# Patient Record
Sex: MALE | Race: WHITE | NOT HISPANIC OR LATINO | URBAN - METROPOLITAN AREA
[De-identification: names, ages, dates, MRNs, and addresses within clinical notes are randomized per-mention and may not be internally consistent; named-entity substitution may affect disease eponyms.]

---

## 2017-02-17 RX ORDER — LOSARTAN/HYDROCHLOROTHIAZIDE 100MG-25MG
0 TABLET ORAL
Qty: 0 | Refills: 0 | COMMUNITY
Start: 2017-02-17

## 2017-03-15 RX ORDER — SIMVASTATIN 20 MG/1
0 TABLET, FILM COATED ORAL
Qty: 90 | Refills: 0 | COMMUNITY
Start: 2017-03-15

## 2017-03-15 RX ORDER — SIMVASTATIN 20 MG/1
0 TABLET, FILM COATED ORAL
Qty: 0 | Refills: 0 | COMMUNITY
Start: 2017-03-15

## 2017-05-04 PROBLEM — Z00.00 ENCOUNTER FOR PREVENTIVE HEALTH EXAMINATION: Status: ACTIVE | Noted: 2017-05-04

## 2017-05-05 ENCOUNTER — OUTPATIENT (OUTPATIENT)
Dept: OUTPATIENT SERVICES | Facility: HOSPITAL | Age: 57
LOS: 1 days | End: 2017-05-05
Payer: COMMERCIAL

## 2017-05-05 VITALS
WEIGHT: 257.94 LBS | RESPIRATION RATE: 16 BRPM | HEIGHT: 69 IN | TEMPERATURE: 98 F | HEART RATE: 87 BPM | SYSTOLIC BLOOD PRESSURE: 147 MMHG | DIASTOLIC BLOOD PRESSURE: 95 MMHG

## 2017-05-05 DIAGNOSIS — R31.29 OTHER MICROSCOPIC HEMATURIA: ICD-10-CM

## 2017-05-05 DIAGNOSIS — Z01.818 ENCOUNTER FOR OTHER PREPROCEDURAL EXAMINATION: ICD-10-CM

## 2017-05-05 DIAGNOSIS — I10 ESSENTIAL (PRIMARY) HYPERTENSION: ICD-10-CM

## 2017-05-05 DIAGNOSIS — D58.2 OTHER HEMOGLOBINOPATHIES: ICD-10-CM

## 2017-05-05 DIAGNOSIS — Z41.9 ENCOUNTER FOR PROCEDURE FOR PURPOSES OTHER THAN REMEDYING HEALTH STATE, UNSPECIFIED: Chronic | ICD-10-CM

## 2017-05-05 DIAGNOSIS — C43.9 MALIGNANT MELANOMA OF SKIN, UNSPECIFIED: Chronic | ICD-10-CM

## 2017-05-05 LAB
ALBUMIN SERPL ELPH-MCNC: 3.7 G/DL — SIGNIFICANT CHANGE UP (ref 3.3–5)
ALP SERPL-CCNC: 63 U/L — SIGNIFICANT CHANGE UP (ref 40–120)
ALT FLD-CCNC: 14 U/L — SIGNIFICANT CHANGE UP (ref 12–78)
ANION GAP SERPL CALC-SCNC: 8 MMOL/L — SIGNIFICANT CHANGE UP (ref 5–17)
APPEARANCE UR: CLEAR — SIGNIFICANT CHANGE UP
AST SERPL-CCNC: 26 U/L — SIGNIFICANT CHANGE UP (ref 15–37)
BILIRUB SERPL-MCNC: 1.5 MG/DL — HIGH (ref 0.2–1.2)
BILIRUB UR-MCNC: NEGATIVE — SIGNIFICANT CHANGE UP
BUN SERPL-MCNC: 21 MG/DL — SIGNIFICANT CHANGE UP (ref 7–23)
CALCIUM SERPL-MCNC: 9.6 MG/DL — SIGNIFICANT CHANGE UP (ref 8.5–10.1)
CHLORIDE SERPL-SCNC: 103 MMOL/L — SIGNIFICANT CHANGE UP (ref 96–108)
CO2 SERPL-SCNC: 28 MMOL/L — SIGNIFICANT CHANGE UP (ref 22–31)
COLOR SPEC: YELLOW — SIGNIFICANT CHANGE UP
CREAT SERPL-MCNC: 1.1 MG/DL — SIGNIFICANT CHANGE UP (ref 0.5–1.3)
DIFF PNL FLD: ABNORMAL
GLUCOSE SERPL-MCNC: 64 MG/DL — LOW (ref 70–99)
GLUCOSE UR QL: NEGATIVE — SIGNIFICANT CHANGE UP
HCT VFR BLD CALC: 64.8 % — CRITICAL HIGH (ref 39–50)
HGB BLD-MCNC: 20.5 G/DL — CRITICAL HIGH (ref 13–17)
KETONES UR-MCNC: NEGATIVE — SIGNIFICANT CHANGE UP
LEUKOCYTE ESTERASE UR-ACNC: NEGATIVE — SIGNIFICANT CHANGE UP
MCHC RBC-ENTMCNC: 27.9 PG — SIGNIFICANT CHANGE UP (ref 27–34)
MCHC RBC-ENTMCNC: 31.6 GM/DL — LOW (ref 32–36)
MCV RBC AUTO: 88.4 FL — SIGNIFICANT CHANGE UP (ref 80–100)
NITRITE UR-MCNC: NEGATIVE — SIGNIFICANT CHANGE UP
PH UR: 5 — SIGNIFICANT CHANGE UP (ref 5–8)
PLATELET # BLD AUTO: 236 K/UL — SIGNIFICANT CHANGE UP (ref 150–400)
POTASSIUM SERPL-MCNC: 3.7 MMOL/L — SIGNIFICANT CHANGE UP (ref 3.5–5.3)
POTASSIUM SERPL-SCNC: 3.7 MMOL/L — SIGNIFICANT CHANGE UP (ref 3.5–5.3)
PROT SERPL-MCNC: 7.7 G/DL — SIGNIFICANT CHANGE UP (ref 6–8.3)
PROT UR-MCNC: 25 MG/DL
RBC # BLD: 7.33 M/UL — HIGH (ref 4.2–5.8)
RBC # FLD: 16.1 % — HIGH (ref 10.3–14.5)
SODIUM SERPL-SCNC: 139 MMOL/L — SIGNIFICANT CHANGE UP (ref 135–145)
SP GR SPEC: 1.01 — SIGNIFICANT CHANGE UP (ref 1.01–1.02)
UROBILINOGEN FLD QL: NEGATIVE — SIGNIFICANT CHANGE UP
WBC # BLD: 7.6 K/UL — SIGNIFICANT CHANGE UP (ref 3.8–10.5)
WBC # FLD AUTO: 7.6 K/UL — SIGNIFICANT CHANGE UP (ref 3.8–10.5)

## 2017-05-05 PROCEDURE — 87086 URINE CULTURE/COLONY COUNT: CPT

## 2017-05-05 PROCEDURE — 93005 ELECTROCARDIOGRAM TRACING: CPT

## 2017-05-05 PROCEDURE — G0463: CPT

## 2017-05-05 PROCEDURE — 85027 COMPLETE CBC AUTOMATED: CPT

## 2017-05-05 PROCEDURE — 80053 COMPREHEN METABOLIC PANEL: CPT

## 2017-05-05 PROCEDURE — 81001 URINALYSIS AUTO W/SCOPE: CPT

## 2017-05-05 PROCEDURE — 93010 ELECTROCARDIOGRAM REPORT: CPT | Mod: NC

## 2017-05-05 NOTE — H&P PST ADULT - HISTORY OF PRESENT ILLNESS
58 yo male scheduled for Cysto on 5/15/17 with Dr Castro.  Patient states he had hematuria in February 2017.  Repeat urinalysis one month ago showed hematuria.

## 2017-05-05 NOTE — H&P PST ADULT - FAMILY HISTORY
Mother  Still living? No  Family history of ovarian cancer, Age at diagnosis: Age Unknown     Father  Still living? No  Family history of renal cancer, Age at diagnosis: Age Unknown     Sibling  Still living? Yes, Estimated age: 60  Family history of breast cancer, Age at diagnosis: Age Unknown

## 2017-05-05 NOTE — H&P PST ADULT - PROBLEM SELECTOR PLAN 3
Hgb 20.4 Dr Darling was notified by phone 5/5/17  11:40am    Dr Darling states patient has Polycythemia and is treated with phlebotomy  Results of EKG and labs  were faxed to Dr Darling

## 2017-05-05 NOTE — H&P PST ADULT - NSANTHOSAYNRD_GEN_A_CORE
No. CAITLIN screening performed.  STOP BANG Legend: 0-2 = LOW Risk; 3-4 = INTERMEDIATE Risk; 5-8 = HIGH Risk

## 2017-05-05 NOTE — H&P PST ADULT - PROBLEM SELECTOR PLAN 1
56 yo male scheduled for Cysto on 5/15/17 with Dr Castro.   Check labs CBC CMP UA Urine Culture  Medical Clearance  Preop instructions were given  Patient verbalizes understanding of instructions

## 2017-05-06 LAB
CULTURE RESULTS: NO GROWTH — SIGNIFICANT CHANGE UP
SPECIMEN SOURCE: SIGNIFICANT CHANGE UP

## 2017-05-16 ENCOUNTER — OUTPATIENT (OUTPATIENT)
Dept: OUTPATIENT SERVICES | Facility: HOSPITAL | Age: 57
LOS: 1 days | End: 2017-05-16
Payer: COMMERCIAL

## 2017-05-16 DIAGNOSIS — C43.9 MALIGNANT MELANOMA OF SKIN, UNSPECIFIED: Chronic | ICD-10-CM

## 2017-05-16 DIAGNOSIS — Z41.9 ENCOUNTER FOR PROCEDURE FOR PURPOSES OTHER THAN REMEDYING HEALTH STATE, UNSPECIFIED: Chronic | ICD-10-CM

## 2017-05-16 PROCEDURE — 85018 HEMOGLOBIN: CPT

## 2017-05-16 PROCEDURE — 99195 PHLEBOTOMY: CPT

## 2017-05-17 DIAGNOSIS — R31.29 OTHER MICROSCOPIC HEMATURIA: ICD-10-CM

## 2017-05-18 ENCOUNTER — OUTPATIENT (OUTPATIENT)
Dept: OUTPATIENT SERVICES | Facility: HOSPITAL | Age: 57
LOS: 1 days | End: 2017-05-18
Payer: COMMERCIAL

## 2017-05-18 DIAGNOSIS — C43.9 MALIGNANT MELANOMA OF SKIN, UNSPECIFIED: Chronic | ICD-10-CM

## 2017-05-18 DIAGNOSIS — D75.1 SECONDARY POLYCYTHEMIA: ICD-10-CM

## 2017-05-18 DIAGNOSIS — Z41.9 ENCOUNTER FOR PROCEDURE FOR PURPOSES OTHER THAN REMEDYING HEALTH STATE, UNSPECIFIED: Chronic | ICD-10-CM

## 2017-05-18 PROCEDURE — 99195 PHLEBOTOMY: CPT

## 2017-05-19 RX ORDER — SODIUM CHLORIDE 9 MG/ML
1000 INJECTION, SOLUTION INTRAVENOUS
Qty: 0 | Refills: 0 | Status: DISCONTINUED | OUTPATIENT
Start: 2017-05-22 | End: 2017-06-06

## 2017-05-22 ENCOUNTER — OUTPATIENT (OUTPATIENT)
Dept: OUTPATIENT SERVICES | Facility: HOSPITAL | Age: 57
LOS: 1 days | Discharge: HOME | End: 2017-05-22
Payer: COMMERCIAL

## 2017-05-22 ENCOUNTER — TRANSCRIPTION ENCOUNTER (OUTPATIENT)
Age: 57
End: 2017-05-22

## 2017-05-22 VITALS
HEIGHT: 69 IN | HEART RATE: 68 BPM | SYSTOLIC BLOOD PRESSURE: 129 MMHG | WEIGHT: 257.94 LBS | OXYGEN SATURATION: 96 % | TEMPERATURE: 98 F | DIASTOLIC BLOOD PRESSURE: 81 MMHG | RESPIRATION RATE: 15 BRPM

## 2017-05-22 VITALS
TEMPERATURE: 98 F | SYSTOLIC BLOOD PRESSURE: 116 MMHG | HEART RATE: 60 BPM | OXYGEN SATURATION: 98 % | RESPIRATION RATE: 12 BRPM | DIASTOLIC BLOOD PRESSURE: 80 MMHG

## 2017-05-22 DIAGNOSIS — Z41.9 ENCOUNTER FOR PROCEDURE FOR PURPOSES OTHER THAN REMEDYING HEALTH STATE, UNSPECIFIED: Chronic | ICD-10-CM

## 2017-05-22 DIAGNOSIS — R31.29 OTHER MICROSCOPIC HEMATURIA: ICD-10-CM

## 2017-05-22 DIAGNOSIS — C43.9 MALIGNANT MELANOMA OF SKIN, UNSPECIFIED: Chronic | ICD-10-CM

## 2017-05-22 LAB
HCT VFR BLD CALC: 52.3 % — HIGH (ref 39–50)
HGB BLD-MCNC: 16.4 G/DL — SIGNIFICANT CHANGE UP (ref 13–17)
MCHC RBC-ENTMCNC: 27.8 PG — SIGNIFICANT CHANGE UP (ref 27–34)
MCHC RBC-ENTMCNC: 31.3 GM/DL — LOW (ref 32–36)
MCV RBC AUTO: 88.9 FL — SIGNIFICANT CHANGE UP (ref 80–100)
PLATELET # BLD AUTO: 258 K/UL — SIGNIFICANT CHANGE UP (ref 150–400)
RBC # BLD: 5.88 M/UL — HIGH (ref 4.2–5.8)
RBC # FLD: 16.5 % — HIGH (ref 10.3–14.5)
WBC # BLD: 7 K/UL — SIGNIFICANT CHANGE UP (ref 3.8–10.5)
WBC # FLD AUTO: 7 K/UL — SIGNIFICANT CHANGE UP (ref 3.8–10.5)

## 2017-05-22 PROCEDURE — 52000 CYSTOURETHROSCOPY: CPT

## 2017-05-22 PROCEDURE — 85027 COMPLETE CBC AUTOMATED: CPT

## 2017-05-22 RX ORDER — CEFAZOLIN SODIUM 1 G
2000 VIAL (EA) INJECTION ONCE
Qty: 0 | Refills: 0 | Status: DISCONTINUED | OUTPATIENT
Start: 2017-05-22 | End: 2017-06-06

## 2017-05-22 RX ORDER — HYDROMORPHONE HYDROCHLORIDE 2 MG/ML
0.5 INJECTION INTRAMUSCULAR; INTRAVENOUS; SUBCUTANEOUS
Qty: 0 | Refills: 0 | Status: DISCONTINUED | OUTPATIENT
Start: 2017-05-22 | End: 2017-05-22

## 2017-05-22 RX ORDER — ONDANSETRON 8 MG/1
4 TABLET, FILM COATED ORAL ONCE
Qty: 0 | Refills: 0 | Status: DISCONTINUED | OUTPATIENT
Start: 2017-05-22 | End: 2017-05-22

## 2017-05-22 RX ORDER — SODIUM CHLORIDE 9 MG/ML
1000 INJECTION, SOLUTION INTRAVENOUS
Qty: 0 | Refills: 0 | Status: DISCONTINUED | OUTPATIENT
Start: 2017-05-22 | End: 2017-05-22

## 2017-05-22 RX ADMIN — SODIUM CHLORIDE 75 MILLILITER(S): 9 INJECTION, SOLUTION INTRAVENOUS at 12:10

## 2017-05-22 RX ADMIN — SODIUM CHLORIDE 75 MILLILITER(S): 9 INJECTION, SOLUTION INTRAVENOUS at 10:46

## 2017-05-22 NOTE — BRIEF OPERATIVE NOTE - POST-OP DX
Benign nodular prostatic hyperplasia, presence of lower urinary tract symptoms unspecified  05/22/2017    Active  Martell Castro

## 2017-05-22 NOTE — ASU PATIENT PROFILE, ADULT - PMH
Hyperlipidemia    Hypertension    Other microscopic hematuria    Polycythemia  followed by hematologist treated with phlebotomy

## 2017-05-22 NOTE — ASU DISCHARGE PLAN (ADULT/PEDIATRIC). - MEDICATION SUMMARY - MEDICATIONS TO TAKE
I will START or STAY ON the medications listed below when I get home from the hospital:    SIMVASTATIN 40 MG TABLET  -- Indication: For takes at home    LOSARTAN-HCTZ 100-25 MG TAB  -- Indication: For takes at home

## 2017-05-26 DIAGNOSIS — Z88.3 ALLERGY STATUS TO OTHER ANTI-INFECTIVE AGENTS: ICD-10-CM

## 2017-05-26 DIAGNOSIS — E78.00 PURE HYPERCHOLESTEROLEMIA, UNSPECIFIED: ICD-10-CM

## 2017-05-26 DIAGNOSIS — I31.3 PERICARDIAL EFFUSION (NONINFLAMMATORY): ICD-10-CM

## 2017-05-26 DIAGNOSIS — N40.0 BENIGN PROSTATIC HYPERPLASIA WITHOUT LOWER URINARY TRACT SYMPTOMS: ICD-10-CM

## 2017-05-26 DIAGNOSIS — N32.89 OTHER SPECIFIED DISORDERS OF BLADDER: ICD-10-CM

## 2017-05-26 DIAGNOSIS — I10 ESSENTIAL (PRIMARY) HYPERTENSION: ICD-10-CM

## 2017-06-28 ENCOUNTER — OUTPATIENT (OUTPATIENT)
Dept: OUTPATIENT SERVICES | Facility: HOSPITAL | Age: 57
LOS: 1 days | End: 2017-06-28
Payer: COMMERCIAL

## 2017-06-28 VITALS
DIASTOLIC BLOOD PRESSURE: 82 MMHG | HEIGHT: 69 IN | OXYGEN SATURATION: 95 % | HEART RATE: 76 BPM | SYSTOLIC BLOOD PRESSURE: 126 MMHG | WEIGHT: 255.07 LBS | RESPIRATION RATE: 16 BRPM | TEMPERATURE: 98 F

## 2017-06-28 DIAGNOSIS — Z41.9 ENCOUNTER FOR PROCEDURE FOR PURPOSES OTHER THAN REMEDYING HEALTH STATE, UNSPECIFIED: Chronic | ICD-10-CM

## 2017-06-28 DIAGNOSIS — C43.9 MALIGNANT MELANOMA OF SKIN, UNSPECIFIED: Chronic | ICD-10-CM

## 2017-06-28 DIAGNOSIS — R07.89 OTHER CHEST PAIN: ICD-10-CM

## 2017-06-28 LAB
ANION GAP SERPL CALC-SCNC: 14 MMOL/L — SIGNIFICANT CHANGE UP (ref 5–17)
BUN SERPL-MCNC: 20 MG/DL — SIGNIFICANT CHANGE UP (ref 7–23)
CALCIUM SERPL-MCNC: 9.8 MG/DL — SIGNIFICANT CHANGE UP (ref 8.4–10.5)
CHLORIDE SERPL-SCNC: 102 MMOL/L — SIGNIFICANT CHANGE UP (ref 96–108)
CO2 SERPL-SCNC: 23 MMOL/L — SIGNIFICANT CHANGE UP (ref 22–31)
CREAT SERPL-MCNC: 1.11 MG/DL — SIGNIFICANT CHANGE UP (ref 0.5–1.3)
GLUCOSE SERPL-MCNC: 102 MG/DL — HIGH (ref 70–99)
HCT VFR BLD CALC: 58.9 % — CRITICAL HIGH (ref 39–50)
HGB BLD-MCNC: 18 G/DL — HIGH (ref 13–17)
MCHC RBC-ENTMCNC: 26.3 PG — LOW (ref 27–34)
MCHC RBC-ENTMCNC: 30.6 GM/DL — LOW (ref 32–36)
MCV RBC AUTO: 85.8 FL — SIGNIFICANT CHANGE UP (ref 80–100)
PLATELET # BLD AUTO: 271 K/UL — SIGNIFICANT CHANGE UP (ref 150–400)
POTASSIUM SERPL-MCNC: 4.1 MMOL/L — SIGNIFICANT CHANGE UP (ref 3.5–5.3)
POTASSIUM SERPL-SCNC: 4.1 MMOL/L — SIGNIFICANT CHANGE UP (ref 3.5–5.3)
RBC # BLD: 6.86 M/UL — HIGH (ref 4.2–5.8)
RBC # FLD: 15.9 % — HIGH (ref 10.3–14.5)
SODIUM SERPL-SCNC: 139 MMOL/L — SIGNIFICANT CHANGE UP (ref 135–145)
WBC # BLD: 9.1 K/UL — SIGNIFICANT CHANGE UP (ref 3.8–10.5)
WBC # FLD AUTO: 9.1 K/UL — SIGNIFICANT CHANGE UP (ref 3.8–10.5)

## 2017-06-28 PROCEDURE — 93458 L HRT ARTERY/VENTRICLE ANGIO: CPT

## 2017-06-28 PROCEDURE — C1894: CPT

## 2017-06-28 PROCEDURE — 93571 IV DOP VEL&/PRESS C FLO 1ST: CPT | Mod: 26,LD

## 2017-06-28 PROCEDURE — 93005 ELECTROCARDIOGRAM TRACING: CPT

## 2017-06-28 PROCEDURE — 93010 ELECTROCARDIOGRAM REPORT: CPT

## 2017-06-28 PROCEDURE — C1887: CPT

## 2017-06-28 PROCEDURE — 93571 IV DOP VEL&/PRESS C FLO 1ST: CPT

## 2017-06-28 PROCEDURE — 93458 L HRT ARTERY/VENTRICLE ANGIO: CPT | Mod: 26

## 2017-06-28 PROCEDURE — 85027 COMPLETE CBC AUTOMATED: CPT

## 2017-06-28 PROCEDURE — 80048 BASIC METABOLIC PNL TOTAL CA: CPT

## 2017-06-28 PROCEDURE — C1769: CPT

## 2017-06-28 NOTE — H&P CARDIOLOGY - HISTORY OF PRESENT ILLNESS
57 year old male h/o HLD, HTN, polycythemia, melanoma who c/o occassional left arm tingling/weakness, indigestion and dizzyness. Evaluated by Dr Dobson who recommended cardiac cath.

## 2017-07-01 ENCOUNTER — TRANSCRIPTION ENCOUNTER (OUTPATIENT)
Age: 57
End: 2017-07-01

## 2018-11-13 NOTE — H&P PST ADULT - SKIN
This record has been dictated.The nurses notes have been reviewed and accepted. The vital signs,medications and allergies have been reviewed.More than 30 minutes were spent on todays visit with greater than 90% of the time spent  in direct face to face consultation .   No lesions; no rash

## 2019-01-24 PROBLEM — E78.5 HYPERLIPIDEMIA, UNSPECIFIED: Chronic | Status: ACTIVE | Noted: 2017-05-05

## 2019-01-24 PROBLEM — I10 ESSENTIAL (PRIMARY) HYPERTENSION: Chronic | Status: ACTIVE | Noted: 2017-05-05

## 2019-01-28 ENCOUNTER — OUTPATIENT (OUTPATIENT)
Dept: OUTPATIENT SERVICES | Facility: HOSPITAL | Age: 59
LOS: 1 days | End: 2019-01-28
Payer: COMMERCIAL

## 2019-01-28 VITALS
TEMPERATURE: 98 F | DIASTOLIC BLOOD PRESSURE: 87 MMHG | OXYGEN SATURATION: 96 % | WEIGHT: 257.06 LBS | SYSTOLIC BLOOD PRESSURE: 125 MMHG | HEART RATE: 84 BPM | RESPIRATION RATE: 16 BRPM

## 2019-01-28 DIAGNOSIS — Z98.890 OTHER SPECIFIED POSTPROCEDURAL STATES: Chronic | ICD-10-CM

## 2019-01-28 DIAGNOSIS — C43.9 MALIGNANT MELANOMA OF SKIN, UNSPECIFIED: Chronic | ICD-10-CM

## 2019-01-28 DIAGNOSIS — Z41.9 ENCOUNTER FOR PROCEDURE FOR PURPOSES OTHER THAN REMEDYING HEALTH STATE, UNSPECIFIED: Chronic | ICD-10-CM

## 2019-01-28 DIAGNOSIS — D45 POLYCYTHEMIA VERA: ICD-10-CM

## 2019-01-28 DIAGNOSIS — Z01.818 ENCOUNTER FOR OTHER PREPROCEDURAL EXAMINATION: ICD-10-CM

## 2019-01-28 DIAGNOSIS — D75.1 SECONDARY POLYCYTHEMIA: ICD-10-CM

## 2019-01-28 DIAGNOSIS — K43.6 OTHER AND UNSPECIFIED VENTRAL HERNIA WITH OBSTRUCTION, WITHOUT GANGRENE: ICD-10-CM

## 2019-01-28 LAB
ALBUMIN SERPL ELPH-MCNC: 3.6 G/DL — SIGNIFICANT CHANGE UP (ref 3.3–5)
ALP SERPL-CCNC: 75 U/L — SIGNIFICANT CHANGE UP (ref 40–120)
ALT FLD-CCNC: 14 U/L — SIGNIFICANT CHANGE UP (ref 12–78)
ANION GAP SERPL CALC-SCNC: 7 MMOL/L — SIGNIFICANT CHANGE UP (ref 5–17)
AST SERPL-CCNC: 29 U/L — SIGNIFICANT CHANGE UP (ref 15–37)
BILIRUB SERPL-MCNC: 1.3 MG/DL — HIGH (ref 0.2–1.2)
BUN SERPL-MCNC: 17 MG/DL — SIGNIFICANT CHANGE UP (ref 7–23)
CALCIUM SERPL-MCNC: 8.9 MG/DL — SIGNIFICANT CHANGE UP (ref 8.5–10.1)
CHLORIDE SERPL-SCNC: 107 MMOL/L — SIGNIFICANT CHANGE UP (ref 96–108)
CO2 SERPL-SCNC: 27 MMOL/L — SIGNIFICANT CHANGE UP (ref 22–31)
CREAT SERPL-MCNC: 1.1 MG/DL — SIGNIFICANT CHANGE UP (ref 0.5–1.3)
GLUCOSE SERPL-MCNC: 78 MG/DL — SIGNIFICANT CHANGE UP (ref 70–99)
HCT VFR BLD CALC: 58.8 % — CRITICAL HIGH (ref 39–50)
HGB BLD-MCNC: 18.9 G/DL — HIGH (ref 13–17)
MCHC RBC-ENTMCNC: 27.1 PG — SIGNIFICANT CHANGE UP (ref 27–34)
MCHC RBC-ENTMCNC: 32.1 GM/DL — SIGNIFICANT CHANGE UP (ref 32–36)
MCV RBC AUTO: 84.2 FL — SIGNIFICANT CHANGE UP (ref 80–100)
NRBC # BLD: 0 /100 WBCS — SIGNIFICANT CHANGE UP (ref 0–0)
PLATELET # BLD AUTO: 231 K/UL — SIGNIFICANT CHANGE UP (ref 150–400)
POTASSIUM SERPL-MCNC: 3.6 MMOL/L — SIGNIFICANT CHANGE UP (ref 3.5–5.3)
POTASSIUM SERPL-SCNC: 3.6 MMOL/L — SIGNIFICANT CHANGE UP (ref 3.5–5.3)
PROT SERPL-MCNC: 7.7 G/DL — SIGNIFICANT CHANGE UP (ref 6–8.3)
RBC # BLD: 6.98 M/UL — HIGH (ref 4.2–5.8)
RBC # FLD: 18.5 % — HIGH (ref 10.3–14.5)
SODIUM SERPL-SCNC: 141 MMOL/L — SIGNIFICANT CHANGE UP (ref 135–145)
WBC # BLD: 7.84 K/UL — SIGNIFICANT CHANGE UP (ref 3.8–10.5)
WBC # FLD AUTO: 7.84 K/UL — SIGNIFICANT CHANGE UP (ref 3.8–10.5)

## 2019-01-28 PROCEDURE — 80053 COMPREHEN METABOLIC PANEL: CPT

## 2019-01-28 PROCEDURE — 93005 ELECTROCARDIOGRAM TRACING: CPT

## 2019-01-28 PROCEDURE — 36415 COLL VENOUS BLD VENIPUNCTURE: CPT

## 2019-01-28 PROCEDURE — 85027 COMPLETE CBC AUTOMATED: CPT

## 2019-01-28 PROCEDURE — 99195 PHLEBOTOMY: CPT

## 2019-01-28 PROCEDURE — 93010 ELECTROCARDIOGRAM REPORT: CPT | Mod: NC

## 2019-01-28 PROCEDURE — G0463: CPT

## 2019-01-28 RX ORDER — AMLODIPINE BESYLATE 2.5 MG/1
0 TABLET ORAL
Qty: 30 | Refills: 0 | COMMUNITY

## 2019-01-28 NOTE — H&P PST ADULT - NEGATIVE CARDIOVASCULAR SYMPTOMS
no paroxysmal nocturnal dyspnea/no orthopnea/no palpitations/no dyspnea on exertion/no chest pain/no claudication/no peripheral edema

## 2019-01-28 NOTE — H&P PST ADULT - RS GEN PE MLT RESP DETAILS PC
breath sounds equal/good air movement/normal/airway patent/respirations non-labored/clear to auscultation bilaterally

## 2019-01-28 NOTE — H&P PST ADULT - HEMATOLOGY/LYMPHATICS COMMENTS
Pt followed by hematologist for Polycythemia Vera and donates 2 units of blood every 4 months. To get phlebotomy this morning at [facility] for 1 unit. Pt followed by hematologist for Polycythemia Vera and donates 2 units of blood every 4 months, last done on 12/11/18. To get phlebotomy this morning at [facility] for 1 unit.

## 2019-01-28 NOTE — H&P PST ADULT - PROBLEM SELECTOR PLAN 1
56 yo male scheduled for Cysto on 5/15/17 with Dr Catsro.   Check labs CBC CMP UA Urine Culture  Medical Clearance  Preop instructions were given  Patient verbalizes understanding of instructions Repair ventral hernia with mesh on 2/6/19.

## 2019-01-28 NOTE — H&P PST ADULT - HISTORY OF PRESENT ILLNESS
58 yo male with PMH of HTN and HLD here for PST. Pt first diagnosed with ventral hernia about a few years ago. Pt reports to ventral hernia increasing in size. Pt denies pain or discomfort of hernia. Pt denies n/v/d and abdominal pain. Pt electing for repair ventral hernia with mesh on 2/6/19.

## 2019-01-28 NOTE — H&P PST ADULT - PSH
Elective surgery  (Left Ureter Surgery 1981)  History of colonoscopy    Melanoma of skin  (Back, Stage 1  2006) Elective surgery  (Left Ureter Surgery 1981)  History of colonoscopy    Melanoma of skin  (Back, Stage 1  2006)  S/P cystoscopy  (Ureteroscopy, 2017)

## 2019-01-28 NOTE — H&P PST ADULT - PMH
Hyperlipidemia    Hypertension    Other and unspecified ventral hernia with obstruction, without gangrene    Polycythemia  (Dx in 2014, followed by hematologist treated with phlebotomy, to have phlebtotmy on 1/28/19 pre-op) Hyperlipidemia    Hypertension    Other and unspecified ventral hernia with obstruction, without gangrene    Polycythemia  (Dx in 2014, followed by hematologist treated with phlebotomy, to have phlebotomy on 1/28/19 pre-op)

## 2019-01-28 NOTE — H&P PST ADULT - GASTROINTESTINAL DETAILS
normal/nontender/no guarding/bowel sounds normal/soft/no rigidity/no masses palpable/no organomegaly/no bruit/no rebound tenderness/no distention

## 2019-01-28 NOTE — H&P PST ADULT - PROBLEM SELECTOR PLAN 3
Hgb 20.4 Dr Darling was notified by phone 5/5/17  11:40am    Dr Darling states patient has Polycythemia and is treated with phlebotomy  Results of EKG and labs  were faxed to Dr Darling Medical clearance needed as per surgeon. CBC, Comprehensive panel and EKG ordered. Pre-op instructions and surgical scrubs given and pt verbalized understanding.

## 2019-01-28 NOTE — H&P PST ADULT - ASSESSMENT
60 yo male with other and unspecified ventral hernia with obstruction, without gangrene 60 yo male with other and unspecified ventral hernia with obstruction, without gangrene.

## 2019-01-28 NOTE — H&P PST ADULT - PROBLEM SELECTOR PLAN 2
no medication the morning of surgery  Medical Clearance Pt having phlebotomy of 1 unit done at [facility] today at 10am. Called for last hematology office visit note to be faxed to PST.

## 2019-01-29 PROBLEM — R31.29 OTHER MICROSCOPIC HEMATURIA: Chronic | Status: INACTIVE | Noted: 2017-05-05 | Resolved: 2019-01-28

## 2019-01-29 PROBLEM — D75.1 SECONDARY POLYCYTHEMIA: Chronic | Status: ACTIVE | Noted: 2017-05-05

## 2019-02-05 ENCOUNTER — TRANSCRIPTION ENCOUNTER (OUTPATIENT)
Age: 59
End: 2019-02-05

## 2019-02-06 ENCOUNTER — RESULT REVIEW (OUTPATIENT)
Age: 59
End: 2019-02-06

## 2019-02-06 ENCOUNTER — OUTPATIENT (OUTPATIENT)
Dept: OUTPATIENT SERVICES | Facility: HOSPITAL | Age: 59
LOS: 1 days | End: 2019-02-06
Payer: COMMERCIAL

## 2019-02-06 VITALS
TEMPERATURE: 98 F | DIASTOLIC BLOOD PRESSURE: 90 MMHG | SYSTOLIC BLOOD PRESSURE: 130 MMHG | WEIGHT: 257.06 LBS | RESPIRATION RATE: 15 BRPM | HEIGHT: 69 IN | HEART RATE: 78 BPM

## 2019-02-06 VITALS — TEMPERATURE: 98 F

## 2019-02-06 DIAGNOSIS — C43.9 MALIGNANT MELANOMA OF SKIN, UNSPECIFIED: Chronic | ICD-10-CM

## 2019-02-06 DIAGNOSIS — Z01.818 ENCOUNTER FOR OTHER PREPROCEDURAL EXAMINATION: ICD-10-CM

## 2019-02-06 DIAGNOSIS — Z41.9 ENCOUNTER FOR PROCEDURE FOR PURPOSES OTHER THAN REMEDYING HEALTH STATE, UNSPECIFIED: Chronic | ICD-10-CM

## 2019-02-06 DIAGNOSIS — Z98.890 OTHER SPECIFIED POSTPROCEDURAL STATES: Chronic | ICD-10-CM

## 2019-02-06 DIAGNOSIS — K43.6 OTHER AND UNSPECIFIED VENTRAL HERNIA WITH OBSTRUCTION, WITHOUT GANGRENE: ICD-10-CM

## 2019-02-06 PROCEDURE — C1781: CPT

## 2019-02-06 PROCEDURE — 49561: CPT

## 2019-02-06 PROCEDURE — 88305 TISSUE EXAM BY PATHOLOGIST: CPT | Mod: 26

## 2019-02-06 PROCEDURE — 49568: CPT

## 2019-02-06 RX ORDER — CEFAZOLIN SODIUM 1 G
1000 VIAL (EA) INJECTION ONCE
Qty: 0 | Refills: 0 | Status: COMPLETED | OUTPATIENT
Start: 2019-02-06 | End: 2019-02-06

## 2019-02-06 RX ORDER — SODIUM CHLORIDE 9 MG/ML
1000 INJECTION, SOLUTION INTRAVENOUS
Qty: 0 | Refills: 0 | Status: DISCONTINUED | OUTPATIENT
Start: 2019-02-06 | End: 2019-02-06

## 2019-02-06 RX ORDER — HYDROMORPHONE HYDROCHLORIDE 2 MG/ML
0.5 INJECTION INTRAMUSCULAR; INTRAVENOUS; SUBCUTANEOUS
Qty: 0 | Refills: 0 | Status: DISCONTINUED | OUTPATIENT
Start: 2019-02-06 | End: 2019-02-06

## 2019-02-06 RX ORDER — ONDANSETRON 8 MG/1
4 TABLET, FILM COATED ORAL ONCE
Qty: 0 | Refills: 0 | Status: DISCONTINUED | OUTPATIENT
Start: 2019-02-06 | End: 2019-02-06

## 2019-02-06 RX ORDER — OXYCODONE HYDROCHLORIDE 5 MG/1
10 TABLET ORAL ONCE
Qty: 0 | Refills: 0 | Status: DISCONTINUED | OUTPATIENT
Start: 2019-02-06 | End: 2019-02-06

## 2019-02-06 RX ORDER — OXYCODONE HYDROCHLORIDE 5 MG/1
5 TABLET ORAL ONCE
Qty: 0 | Refills: 0 | Status: DISCONTINUED | OUTPATIENT
Start: 2019-02-06 | End: 2019-02-06

## 2019-02-06 RX ADMIN — SODIUM CHLORIDE 75 MILLILITER(S): 9 INJECTION, SOLUTION INTRAVENOUS at 13:21

## 2019-02-06 RX ADMIN — SODIUM CHLORIDE 75 MILLILITER(S): 9 INJECTION, SOLUTION INTRAVENOUS at 09:19

## 2019-02-06 NOTE — ASU DISCHARGE PLAN (ADULT/PEDIATRIC). - MEDICATION SUMMARY - MEDICATIONS TO TAKE
I will START or STAY ON the medications listed below when I get home from the hospital:    Norco 5 mg-325 mg oral tablet  -- 2 tab(s) by mouth every 4 to 6 hours, As Needed -for severe pain MDD:10   -- Caution federal law prohibits the transfer of this drug to any person other  than the person for whom it was prescribed.  May cause drowsiness.  Alcohol may intensify this effect.  Use care when operating dangerous machinery.  This product contains acetaminophen.  Do not use  with any other product containing acetaminophen to prevent possible liver damage.  Using more of this medication than prescribed may cause serious breathing problems.    -- Indication: For pain    SIMVASTATIN 40 MG TABLET  -- orally once a day (at bedtime)  -- Indication: For cholesterol medication    ezetimibe 10 mg oral tablet  -- orally once a day (at bedtime)  -- Indication: For cholesterol medication    LOSARTAN-HCTZ 100-25 MG TAB  -- orally once a day  -- Indication: For high blood pressure/heart medication     AMLODIPINE   TAB 5MG  -- orally once a day  -- Indication: For high blood pressure/heart medication

## 2019-02-06 NOTE — ASU PATIENT PROFILE, ADULT - PMH
Hyperlipidemia    Hypertension    Other and unspecified ventral hernia with obstruction, without gangrene    Polycythemia  (Dx in 2014, followed by hematologist treated with phlebotomy, to have phlebotomy on 1/28/19 pre-op)

## 2019-02-06 NOTE — ASU PATIENT PROFILE, ADULT - PSH
Elective surgery  (Left Ureter Surgery 1981)  History of colonoscopy    Melanoma of skin  (Back, Stage 1  2006)  S/P cystoscopy  (Ureteroscopy, 2017)

## 2019-02-07 LAB — SURGICAL PATHOLOGY STUDY: SIGNIFICANT CHANGE UP

## 2019-04-09 ENCOUNTER — TRANSCRIPTION ENCOUNTER (OUTPATIENT)
Age: 59
End: 2019-04-09

## 2019-04-09 ENCOUNTER — INPATIENT (INPATIENT)
Facility: HOSPITAL | Age: 59
LOS: 1 days | Discharge: HOME | DRG: 392 | End: 2019-04-11
Attending: HOSPITALIST | Admitting: FAMILY MEDICINE
Payer: COMMERCIAL

## 2019-04-09 VITALS
HEIGHT: 69 IN | HEART RATE: 124 BPM | RESPIRATION RATE: 18 BRPM | DIASTOLIC BLOOD PRESSURE: 68 MMHG | SYSTOLIC BLOOD PRESSURE: 124 MMHG | WEIGHT: 251.99 LBS | TEMPERATURE: 101 F | OXYGEN SATURATION: 93 %

## 2019-04-09 DIAGNOSIS — Z41.9 ENCOUNTER FOR PROCEDURE FOR PURPOSES OTHER THAN REMEDYING HEALTH STATE, UNSPECIFIED: Chronic | ICD-10-CM

## 2019-04-09 DIAGNOSIS — I10 ESSENTIAL (PRIMARY) HYPERTENSION: ICD-10-CM

## 2019-04-09 DIAGNOSIS — Z29.9 ENCOUNTER FOR PROPHYLACTIC MEASURES, UNSPECIFIED: ICD-10-CM

## 2019-04-09 DIAGNOSIS — Z98.890 OTHER SPECIFIED POSTPROCEDURAL STATES: Chronic | ICD-10-CM

## 2019-04-09 DIAGNOSIS — E78.5 HYPERLIPIDEMIA, UNSPECIFIED: ICD-10-CM

## 2019-04-09 DIAGNOSIS — D75.1 SECONDARY POLYCYTHEMIA: ICD-10-CM

## 2019-04-09 DIAGNOSIS — K57.80 DIVERTICULITIS OF INTESTINE, PART UNSPECIFIED, WITH PERFORATION AND ABSCESS WITHOUT BLEEDING: ICD-10-CM

## 2019-04-09 DIAGNOSIS — C43.9 MALIGNANT MELANOMA OF SKIN, UNSPECIFIED: Chronic | ICD-10-CM

## 2019-04-09 PROBLEM — K43.6 OTHER AND UNSPECIFIED VENTRAL HERNIA WITH OBSTRUCTION, WITHOUT GANGRENE: Chronic | Status: ACTIVE | Noted: 2019-01-28

## 2019-04-09 LAB
ALBUMIN SERPL ELPH-MCNC: 3.2 G/DL — LOW (ref 3.3–5)
ALP SERPL-CCNC: 73 U/L — SIGNIFICANT CHANGE UP (ref 40–120)
ALT FLD-CCNC: 12 U/L — SIGNIFICANT CHANGE UP (ref 12–78)
ANION GAP SERPL CALC-SCNC: 11 MMOL/L — SIGNIFICANT CHANGE UP (ref 5–17)
APPEARANCE UR: CLEAR — SIGNIFICANT CHANGE UP
APTT BLD: 34.6 SEC — SIGNIFICANT CHANGE UP (ref 27.5–36.3)
AST SERPL-CCNC: 21 U/L — SIGNIFICANT CHANGE UP (ref 15–37)
BACTERIA # UR AUTO: ABNORMAL
BASOPHILS # BLD AUTO: 0.04 K/UL — SIGNIFICANT CHANGE UP (ref 0–0.2)
BASOPHILS NFR BLD AUTO: 0.3 % — SIGNIFICANT CHANGE UP (ref 0–2)
BILIRUB SERPL-MCNC: 1.8 MG/DL — HIGH (ref 0.2–1.2)
BILIRUB UR-MCNC: NEGATIVE — SIGNIFICANT CHANGE UP
BUN SERPL-MCNC: 26 MG/DL — HIGH (ref 7–23)
CALCIUM SERPL-MCNC: 9 MG/DL — SIGNIFICANT CHANGE UP (ref 8.5–10.1)
CHLORIDE SERPL-SCNC: 105 MMOL/L — SIGNIFICANT CHANGE UP (ref 96–108)
CO2 SERPL-SCNC: 23 MMOL/L — SIGNIFICANT CHANGE UP (ref 22–31)
COLOR SPEC: YELLOW — SIGNIFICANT CHANGE UP
CREAT SERPL-MCNC: 1.1 MG/DL — SIGNIFICANT CHANGE UP (ref 0.5–1.3)
DIFF PNL FLD: ABNORMAL
EOSINOPHIL # BLD AUTO: 0.03 K/UL — SIGNIFICANT CHANGE UP (ref 0–0.5)
EOSINOPHIL NFR BLD AUTO: 0.2 % — SIGNIFICANT CHANGE UP (ref 0–6)
EPI CELLS # UR: SIGNIFICANT CHANGE UP
FLU A RESULT: SIGNIFICANT CHANGE UP
FLU A RESULT: SIGNIFICANT CHANGE UP
FLUAV AG NPH QL: SIGNIFICANT CHANGE UP
FLUBV AG NPH QL: SIGNIFICANT CHANGE UP
GLUCOSE SERPL-MCNC: 105 MG/DL — HIGH (ref 70–99)
GLUCOSE UR QL: NEGATIVE — SIGNIFICANT CHANGE UP
HCT VFR BLD CALC: 55.3 % — HIGH (ref 39–50)
HGB BLD-MCNC: 18.1 G/DL — HIGH (ref 13–17)
IMM GRANULOCYTES NFR BLD AUTO: 0.4 % — SIGNIFICANT CHANGE UP (ref 0–1.5)
INR BLD: 1.24 RATIO — HIGH (ref 0.88–1.16)
KETONES UR-MCNC: NEGATIVE — SIGNIFICANT CHANGE UP
LACTATE SERPL-SCNC: 0.8 MMOL/L — SIGNIFICANT CHANGE UP (ref 0.7–2)
LEUKOCYTE ESTERASE UR-ACNC: NEGATIVE — SIGNIFICANT CHANGE UP
LIDOCAIN IGE QN: 83 U/L — SIGNIFICANT CHANGE UP (ref 73–393)
LYMPHOCYTES # BLD AUTO: 1.57 K/UL — SIGNIFICANT CHANGE UP (ref 1–3.3)
LYMPHOCYTES # BLD AUTO: 10.6 % — LOW (ref 13–44)
MCHC RBC-ENTMCNC: 26.4 PG — LOW (ref 27–34)
MCHC RBC-ENTMCNC: 32.7 GM/DL — SIGNIFICANT CHANGE UP (ref 32–36)
MCV RBC AUTO: 80.6 FL — SIGNIFICANT CHANGE UP (ref 80–100)
MONOCYTES # BLD AUTO: 1.26 K/UL — HIGH (ref 0–0.9)
MONOCYTES NFR BLD AUTO: 8.5 % — SIGNIFICANT CHANGE UP (ref 2–14)
NEUTROPHILS # BLD AUTO: 11.82 K/UL — HIGH (ref 1.8–7.4)
NEUTROPHILS NFR BLD AUTO: 80 % — HIGH (ref 43–77)
NITRITE UR-MCNC: NEGATIVE — SIGNIFICANT CHANGE UP
NRBC # BLD: 0 /100 WBCS — SIGNIFICANT CHANGE UP (ref 0–0)
PH UR: 5 — SIGNIFICANT CHANGE UP (ref 5–8)
PLATELET # BLD AUTO: 237 K/UL — SIGNIFICANT CHANGE UP (ref 150–400)
POTASSIUM SERPL-MCNC: 3.8 MMOL/L — SIGNIFICANT CHANGE UP (ref 3.5–5.3)
POTASSIUM SERPL-SCNC: 3.8 MMOL/L — SIGNIFICANT CHANGE UP (ref 3.5–5.3)
PROT SERPL-MCNC: 7.4 G/DL — SIGNIFICANT CHANGE UP (ref 6–8.3)
PROT UR-MCNC: 25 MG/DL
PROTHROM AB SERPL-ACNC: 14.1 SEC — HIGH (ref 10–12.9)
RBC # BLD: 6.86 M/UL — HIGH (ref 4.2–5.8)
RBC # FLD: 18.9 % — HIGH (ref 10.3–14.5)
RBC CASTS # UR COMP ASSIST: SIGNIFICANT CHANGE UP /HPF (ref 0–4)
RSV RESULT: SIGNIFICANT CHANGE UP
RSV RNA RESP QL NAA+PROBE: SIGNIFICANT CHANGE UP
SODIUM SERPL-SCNC: 139 MMOL/L — SIGNIFICANT CHANGE UP (ref 135–145)
SP GR SPEC: 1.01 — SIGNIFICANT CHANGE UP (ref 1.01–1.02)
UROBILINOGEN FLD QL: NEGATIVE — SIGNIFICANT CHANGE UP
WBC # BLD: 14.78 K/UL — HIGH (ref 3.8–10.5)
WBC # FLD AUTO: 14.78 K/UL — HIGH (ref 3.8–10.5)
WBC UR QL: SIGNIFICANT CHANGE UP

## 2019-04-09 PROCEDURE — 99285 EMERGENCY DEPT VISIT HI MDM: CPT

## 2019-04-09 PROCEDURE — 74177 CT ABD & PELVIS W/CONTRAST: CPT | Mod: 26

## 2019-04-09 PROCEDURE — 74019 RADEX ABDOMEN 2 VIEWS: CPT | Mod: 26

## 2019-04-09 PROCEDURE — 93010 ELECTROCARDIOGRAM REPORT: CPT

## 2019-04-09 PROCEDURE — 99223 1ST HOSP IP/OBS HIGH 75: CPT | Mod: AI,GC

## 2019-04-09 PROCEDURE — 71045 X-RAY EXAM CHEST 1 VIEW: CPT | Mod: 26

## 2019-04-09 RX ORDER — IOHEXOL 300 MG/ML
30 INJECTION, SOLUTION INTRAVENOUS ONCE
Qty: 0 | Refills: 0 | Status: COMPLETED | OUTPATIENT
Start: 2019-04-09 | End: 2019-04-09

## 2019-04-09 RX ORDER — SIMVASTATIN 20 MG/1
40 TABLET, FILM COATED ORAL AT BEDTIME
Qty: 0 | Refills: 0 | Status: DISCONTINUED | OUTPATIENT
Start: 2019-04-09 | End: 2019-04-11

## 2019-04-09 RX ORDER — MORPHINE SULFATE 50 MG/1
4 CAPSULE, EXTENDED RELEASE ORAL EVERY 6 HOURS
Qty: 0 | Refills: 0 | Status: DISCONTINUED | OUTPATIENT
Start: 2019-04-09 | End: 2019-04-11

## 2019-04-09 RX ORDER — LOSARTAN POTASSIUM 100 MG/1
100 TABLET, FILM COATED ORAL DAILY
Qty: 0 | Refills: 0 | Status: DISCONTINUED | OUTPATIENT
Start: 2019-04-09 | End: 2019-04-11

## 2019-04-09 RX ORDER — ACETAMINOPHEN 500 MG
650 TABLET ORAL EVERY 6 HOURS
Qty: 0 | Refills: 0 | Status: DISCONTINUED | OUTPATIENT
Start: 2019-04-09 | End: 2019-04-11

## 2019-04-09 RX ORDER — PIPERACILLIN AND TAZOBACTAM 4; .5 G/20ML; G/20ML
3.38 INJECTION, POWDER, LYOPHILIZED, FOR SOLUTION INTRAVENOUS EVERY 8 HOURS
Qty: 0 | Refills: 0 | Status: DISCONTINUED | OUTPATIENT
Start: 2019-04-09 | End: 2019-04-11

## 2019-04-09 RX ORDER — MORPHINE SULFATE 50 MG/1
2 CAPSULE, EXTENDED RELEASE ORAL EVERY 6 HOURS
Qty: 0 | Refills: 0 | Status: DISCONTINUED | OUTPATIENT
Start: 2019-04-09 | End: 2019-04-11

## 2019-04-09 RX ORDER — SODIUM CHLORIDE 9 MG/ML
1000 INJECTION INTRAMUSCULAR; INTRAVENOUS; SUBCUTANEOUS
Qty: 0 | Refills: 0 | Status: DISCONTINUED | OUTPATIENT
Start: 2019-04-09 | End: 2019-04-11

## 2019-04-09 RX ORDER — AMLODIPINE BESYLATE 2.5 MG/1
5 TABLET ORAL DAILY
Qty: 0 | Refills: 0 | Status: DISCONTINUED | OUTPATIENT
Start: 2019-04-09 | End: 2019-04-11

## 2019-04-09 RX ORDER — PIPERACILLIN AND TAZOBACTAM 4; .5 G/20ML; G/20ML
3.38 INJECTION, POWDER, LYOPHILIZED, FOR SOLUTION INTRAVENOUS ONCE
Qty: 0 | Refills: 0 | Status: COMPLETED | OUTPATIENT
Start: 2019-04-09 | End: 2019-04-09

## 2019-04-09 RX ORDER — ONDANSETRON 8 MG/1
4 TABLET, FILM COATED ORAL EVERY 6 HOURS
Qty: 0 | Refills: 0 | Status: DISCONTINUED | OUTPATIENT
Start: 2019-04-09 | End: 2019-04-11

## 2019-04-09 RX ORDER — HYDROCHLOROTHIAZIDE 25 MG
25 TABLET ORAL DAILY
Qty: 0 | Refills: 0 | Status: DISCONTINUED | OUTPATIENT
Start: 2019-04-09 | End: 2019-04-11

## 2019-04-09 RX ORDER — SODIUM CHLORIDE 9 MG/ML
1000 INJECTION INTRAMUSCULAR; INTRAVENOUS; SUBCUTANEOUS ONCE
Qty: 0 | Refills: 0 | Status: COMPLETED | OUTPATIENT
Start: 2019-04-09 | End: 2019-04-09

## 2019-04-09 RX ADMIN — IOHEXOL 30 MILLILITER(S): 300 INJECTION, SOLUTION INTRAVENOUS at 14:47

## 2019-04-09 RX ADMIN — SIMVASTATIN 40 MILLIGRAM(S): 20 TABLET, FILM COATED ORAL at 21:29

## 2019-04-09 RX ADMIN — SODIUM CHLORIDE 125 MILLILITER(S): 9 INJECTION INTRAMUSCULAR; INTRAVENOUS; SUBCUTANEOUS at 21:31

## 2019-04-09 RX ADMIN — SODIUM CHLORIDE 1000 MILLILITER(S): 9 INJECTION INTRAMUSCULAR; INTRAVENOUS; SUBCUTANEOUS at 15:45

## 2019-04-09 RX ADMIN — SODIUM CHLORIDE 1000 MILLILITER(S): 9 INJECTION INTRAMUSCULAR; INTRAVENOUS; SUBCUTANEOUS at 14:47

## 2019-04-09 RX ADMIN — PIPERACILLIN AND TAZOBACTAM 200 GRAM(S): 4; .5 INJECTION, POWDER, LYOPHILIZED, FOR SOLUTION INTRAVENOUS at 15:56

## 2019-04-09 NOTE — H&P ADULT - HISTORY OF PRESENT ILLNESS
60 yo M with PMH of HTN, HLD, polycythemia (as per chart review) history of Ventral hernia with obstruction s/p surgery 2/2019, who presented to the ED with LLQ pain that has worsened over the past 3 days.  As per patient, 3 days ago patient began to feel more fatigued with subjective temperature, and soft stools. Today patient noted a temperature of 100.9 at home, and went to Urgent Care. At urgent care patient had a temperature of 102, and was very tender to LLQ palpation and was sent to the ED. As per patient, LLQ "is not bad" but was very tender only when physician palpated area. Admits to chills, denies N/V/C, changes in appetite. Of note as per patient had colonoscopy 6 months ago that showed diverticulosis.      In the ED, patient was febrile (Tmax 101.4), tachycardic (), otherwise hemodynamically stable.  Labs significant for leukocytosis (WBC 14.78) with Left shift (Neut 80%), polycythemia (H/H 18.1/55.3, previously 18.9/58.8), elevated BUN (26), elevated Bilirubin (1.8).  UA significant for protein 25, small blood, occasional bacteria.  EKG: NSR   Flu A/B/RSV negative.  CXR: Minimal infiltrate is seen at left base.    AXR: Nonobstructive bowel gas pattern, no free air.  CT Abdomen/Pelvis with oral/IV Contrast: Microperforated sigmoid diverticulitis, no drainable collection.  Received Zosyn 3.375g, NS Bolus 1L x1.

## 2019-04-09 NOTE — H&P ADULT - PROBLEM SELECTOR PLAN 3
-Chronic issue. Continue simvastatin.  -Patient also on home medication ezetimibe, thereputic interchange, simvastatin 40. -Chronic issue. Continue simvastatin.  -Patient also on home medication ezetimibe, therapeutic interchange, simvastatin 40.

## 2019-04-09 NOTE — ED ADULT TRIAGE NOTE - CHIEF COMPLAINT QUOTE
patient came in ED from Urgent Care for LLQ abdominal pain and 101.9F fever that started last Sunday with diarrhea.

## 2019-04-09 NOTE — H&P ADULT - PROBLEM SELECTOR PLAN 5
BB IMPROVE VTE Individual Risk Assessment          RISK                                                          Points    [  ] Previous VTE                                                3  [  ] Thrombophilia                                             2  [  ] Lower limb paralysis                                   2        (unable to hold up >15 seconds)    [  ] Current Cancer                                            2         (within 6 months)  [  ] Immobilization > 24 hrs                              1  [  ] ICU/CCU stay > 24 hours                            1  [  ] Age > 60                                                    1    IMPROVE VTE Score _________ SCDs for DVT ppx   BB IMPROVE VTE Individual Risk Assessment          RISK                                                          Points    [  ] Previous VTE                                                3  [  ] Thrombophilia                                             2  [  ] Lower limb paralysis                                   2        (unable to hold up >15 seconds)    [  ] Current Cancer                                            2         (within 6 months)  [  ] Immobilization > 24 hrs                              1  [  ] ICU/CCU stay > 24 hours                            1  [  ] Age > 60                                                    1    IMPROVE VTE Score _________

## 2019-04-09 NOTE — ED PROVIDER NOTE - PROGRESS NOTE DETAILS
discussed case with Dr. Mckeon, will admit Dr Moore aware of CT results, recommend admit to medicine service

## 2019-04-09 NOTE — ED ADULT NURSE NOTE - NSIMPLEMENTINTERV_GEN_ALL_ED
Implemented All Universal Safety Interventions:  [place] to call system. Call bell, personal items and telephone within reach. Instruct patient to call for assistance. Room bathroom lighting operational. Non-slip footwear when patient is off stretcher. Physically safe environment: no spills, clutter or unnecessary equipment. Stretcher in lowest position, wheels locked, appropriate side rails in place.

## 2019-04-09 NOTE — H&P ADULT - NSICDXPASTMEDICALHX_GEN_ALL_CORE_FT
PAST MEDICAL HISTORY:  Hyperlipidemia     Hypertension     Other and unspecified ventral hernia with obstruction, without gangrene     Polycythemia (Dx in 2014, followed by hematologist treated with phlebotomy, to have phlebotomy on 1/28/19 pre-op)

## 2019-04-09 NOTE — H&P ADULT - ASSESSMENT
60 yo M with PMH of HTN, HLD, polycythemia (as per chart review) history of Ventral hernia with obstruction s/p surgery 2/2019, who presented to the ED with LLQ pain that has worsened over the past 3 days, admitted for sigmoid diverticulitis with microperforation.

## 2019-04-09 NOTE — CONSULT NOTE ADULT - SUBJECTIVE AND OBJECTIVE BOX
Patient is a 59y old  Male who presents with a chief complaint of fever 102 and LLQ abdominal pain.  NO N/V/C small amount of diarrhea yesterday  Decreased appetite     DURATION: 48 hrs  LOCATION; LLQ  SEVERITY: Severe  MODIFYING FACTORS: Touch and movement.       PAST MEDICAL & SURGICAL HISTORY:  Other and unspecified ventral hernia with obstruction, without gangrene  Polycythemia: (Dx in 2014, followed by hematologist treated with phlebotomy, to have phlebotomy on 1/28/19 pre-op)  Hyperlipidemia  Hypertension  S/P cystoscopy: (Ureteroscopy, 2017)  History of colonoscopy 2018 diverticulosis  Melanoma of skin: (Back, Stage 1  2006)  Elective surgery: (Left Ureter Surgery 1981)      MEDICATIONS  (STANDING):  losartan  HCTZ  ezetimbe  simivistatin  norvasc              No Known Allergies      SOCIAL HISTORY: ***  Tobacco Use-denies  Alcohol Use-3 glasses wine/week  Occupation-retired    FAMILY HISTORY:  Family history of breast cancer (Sibling)  Family history of renal cancer (Father)  Family history of ovarian cancer (Mother)     Father-  Mother-    Vital Signs Last 24 Hrs  T(C): 38.6 (09 Apr 2019 13:56), Max: 38.6 (09 Apr 2019 13:56)  T(F): 101.4 (09 Apr 2019 13:56), Max: 101.4 (09 Apr 2019 13:56)  HR: 124 (09 Apr 2019 13:56) (124 - 124)  BP: 124/68 (09 Apr 2019 13:56) (124/68 - 124/68)  BP(mean): --  RR: 18 (09 Apr 2019 13:56) (18 - 18)  SpO2: 93% (09 Apr 2019 13:56) (93% - 93%)    ROS  General: No acute distress, awake and alert  Head: Normal cephalic/atraumatic  Neck: Denies neck pain or palpable masses, hoarseness or stridor  Lymphatic: Denies cervical or axillary or femoral lymphadenopathy  Chest: No chest pain, cough or hemoptysis  Heart: No chest pain, palpitations  Abdomen: See CC  Extremities: Denies cyanosis, open sores or swollen extremity  Neuro: Denies weakness, paralysis, syncope, loss of vision  Psych: Denies hallucinations, visual disturbances, or depression    PHYSICAL EXAM  General: No acute distress, appears comfortable, conversant, well nourished  Head, Eyes, Ears, Nose, Throat: Normal cephalic/atraumatic, MUNA, EOMI, , anicteric, conjunctiva non injected and moist, vision grossly intact, hearing grossly intact, no nasal discharge, ears and nose symmetrical and atraumatic.  Nasal, oral, and oropharyngeal mucosa pink moist with no evidence of ulceration  Neck: Supple, carotids have good upstroke, trachea in the midline, without JVD or thyromegaly  Lymphatic: No evidence of masses or lymphadenopathy in the head, neck, trunk, axillary, inguinal, or supraclavicular regions  Chest: Lungs are clear to P&A, no wheezing, no rales, no ronchi, with good inspiratory effort  Heart: Heart rhythm regular, no murmurs  Extremity: No swelling, or open sores, no gross deformities,  good range of motion, no edema,  negative Elena's sign  negative, no lymphadenopathy  Neuro: Alert and oriented x3, motor and sensory intact  Psychiatric: Awake , alert, oriented x3 with an appropriate affect.   Skin: Good color, turgor, texture with no gross lesions, no eruptions, no rashes, no subcutaneous nodules and normal temperature.     Abdomen: LLQ tenderness with gaurding.  diminished bowel sounds present in all four quadrants.  Localized  peritoneal signs LQ.  No evidence of hepatosplenomegaly.  No evidence of abdominal wall hernias.  Inguinal regions are unremarkable with no evidence of hernias.     LABS: ALL [id number] STUDIES WERE REVIEWED IN THEIR ENTIRETY                        18.1   14.78 )-----------( 237      ( 09 Apr 2019 14:50 )             55.3     04-09    139  |  105  |  26<H>  ----------------------------<  105<H>  3.8   |  23  |  1.10    Ca    9.0      09 Apr 2019 14:50    TPro  7.4  /  Alb  3.2<L>  /  TBili  1.8<H>  /  DBili  x   /  AST  21  /  ALT  12  /  AlkPhos  73  04-09    PT/INR - ( 09 Apr 2019 14:50 )   PT: 14.1 sec;   INR: 1.24 ratio         PTT - ( 09 Apr 2019 14:50 )  PTT:34.6 sec      RADIOLOGY & ADDITIONAL STUDIES:  ALL RADIOLOGIC STUDIES WERE REVIEWED AND DISCUSSED WITH THE RADIOLOGIST    < from: Xray Chest 1 View AP/PA (04.09.19 @ 16:49) >    EXAM:  XR CHEST AP OR PA 1V                            PROCEDURE DATE:  04/09/2019          INTERPRETATION:  AP semierect chest on April 9, 2019 at 4:40 PM.    Patient has abdominal pain and fever.    Poor inspiration crowds the chest.    The heartdoes not suggest enlargement.    Present film shows minimal infiltrate at left base new since January 20, 2011.    IMPRESSION: Minimal infiltrate is seen at left base.          JOSHUA LOWE M.D., ATTENDING RADIOLOGIST    < end of copied text >    CT pending

## 2019-04-09 NOTE — H&P ADULT - NSICDXFAMILYHX_GEN_ALL_CORE_FT
FAMILY HISTORY:  Family history of ovarian cancer  Family history of renal cancer    Sibling  Still living? Yes, Estimated age: 60  Family history of breast cancer, Age at diagnosis: Age Unknown

## 2019-04-09 NOTE — ED ADULT NURSE NOTE - OBJECTIVE STATEMENT
received pt in bed #17b Pt A&O c/o fever x coupe days & left sided abd pain since yesterday States was set from urgent care. Pt seen by Dr Garcia Will monitor

## 2019-04-09 NOTE — H&P ADULT - PROBLEM SELECTOR PLAN 2
-Patient receives plasmapheresis every 4 months, follows with Dr. Rodriguez, and patient is due for plasmapheresis   -Monitor H and H

## 2019-04-09 NOTE — H&P ADULT - ATTENDING COMMENTS
Pt to be NPO with advancement to clear liquids tomorrow if his pain does not worsen. Will hold VTE prophylaxis in event his condition worsens and he needs emergent surgery.

## 2019-04-09 NOTE — ED PROVIDER NOTE - CLINICAL SUMMARY MEDICAL DECISION MAKING FREE TEXT BOX
abdominal pain, fever, f/u ct abdomen/pelvis, labs, ekg, iv fluids, antibiotics, does not want pain meds at this time

## 2019-04-09 NOTE — ED PROVIDER NOTE - OBJECTIVE STATEMENT
59 male sent to ER from urgent care for evaluation of abdominal pain, fever tamx 102F, started yesterday, having nausea, no vomiting, episode of loose stool, no blood in the stool. Patient took 3 tylenol prior to coming to ER.

## 2019-04-09 NOTE — H&P ADULT - PROBLEM SELECTOR PLAN 1
-Patient presented with LLQ and fever of 102 at urgent care   -CT abdomen: sigmoid diverticulitis with microperforation   -Initially in ED patient febrile, with tachycardia, and elevated WBC   -S/P 1 dose Zosyn in the ED   -Will do cirpro/flagyl   -Diet: NPP   Pain management: Tylenol for mild pain, morphine 2 mg moderate pain, morphine 4 mg severe pain    -Surgery (Dr. Moore), following patient, f/u recs -Patient presented with LLQ and fever of 102 at urgent care   -CT abdomen: sigmoid diverticulitis with microperforation   -Initially in ED patient febrile, with tachycardia, and elevated WBC   -S/P 1 dose Zosyn in the ED, will continue   -Diet: NPO, advance as tolerated   Pain management: Tylenol for mild pain, morphine 2 mg moderate pain, morphine 4 mg severe pain    -Surgery (Dr. Moore), following patient, f/u recs

## 2019-04-09 NOTE — H&P ADULT - NSICDXPASTSURGICALHX_GEN_ALL_CORE_FT
PAST SURGICAL HISTORY:  Elective surgery (Left Ureter Surgery 1981)    H/O ventral hernia repair     History of colonoscopy     Melanoma of skin (Back, Stage 1  2006)    S/P cystoscopy (Ureteroscopy, 2017)

## 2019-04-09 NOTE — H&P ADULT - NSHPREVIEWOFSYSTEMS_GEN_ALL_CORE
CONSTITUTIONAL: No weakness, +fevers or chills  EYES/ENT: No visual changes;  No vertigo or throat pain   RESPIRATORY: No cough, wheezing, hemoptysis; No shortness of breath  CARDIOVASCULAR: No chest pain or palpitations  GASTROINTESTINAL: + abdominal pain, no epigastric pain. No nausea, vomiting, or hematemesis; No diarrhea or constipation. No melena or hematochezia. +soft stool    GENITOURINARY: No dysuria, frequency or hematuria  NEUROLOGICAL: No numbness or weakness  SKIN: No itching, burning, rashes, or lesions   All other review of systems is negative unless indicated above.

## 2019-04-10 ENCOUNTER — TRANSCRIPTION ENCOUNTER (OUTPATIENT)
Age: 59
End: 2019-04-10

## 2019-04-10 LAB
ALBUMIN SERPL ELPH-MCNC: 3 G/DL — LOW (ref 3.3–5)
ALP SERPL-CCNC: 67 U/L — SIGNIFICANT CHANGE UP (ref 40–120)
ALT FLD-CCNC: 11 U/L — LOW (ref 12–78)
ANION GAP SERPL CALC-SCNC: 8 MMOL/L — SIGNIFICANT CHANGE UP (ref 5–17)
AST SERPL-CCNC: 17 U/L — SIGNIFICANT CHANGE UP (ref 15–37)
BASOPHILS # BLD AUTO: 0.04 K/UL — SIGNIFICANT CHANGE UP (ref 0–0.2)
BASOPHILS NFR BLD AUTO: 0.3 % — SIGNIFICANT CHANGE UP (ref 0–2)
BILIRUB SERPL-MCNC: 1.9 MG/DL — HIGH (ref 0.2–1.2)
BUN SERPL-MCNC: 16 MG/DL — SIGNIFICANT CHANGE UP (ref 7–23)
CALCIUM SERPL-MCNC: 8.4 MG/DL — LOW (ref 8.5–10.1)
CHLORIDE SERPL-SCNC: 104 MMOL/L — SIGNIFICANT CHANGE UP (ref 96–108)
CO2 SERPL-SCNC: 29 MMOL/L — SIGNIFICANT CHANGE UP (ref 22–31)
CREAT SERPL-MCNC: 1.3 MG/DL — SIGNIFICANT CHANGE UP (ref 0.5–1.3)
EOSINOPHIL # BLD AUTO: 0.06 K/UL — SIGNIFICANT CHANGE UP (ref 0–0.5)
EOSINOPHIL NFR BLD AUTO: 0.4 % — SIGNIFICANT CHANGE UP (ref 0–6)
GLUCOSE SERPL-MCNC: 89 MG/DL — SIGNIFICANT CHANGE UP (ref 70–99)
HCT VFR BLD CALC: 53.1 % — HIGH (ref 39–50)
HCV AB S/CO SERPL IA: 0.08 S/CO — SIGNIFICANT CHANGE UP (ref 0–0.99)
HCV AB SERPL-IMP: SIGNIFICANT CHANGE UP
HGB BLD-MCNC: 16.9 G/DL — SIGNIFICANT CHANGE UP (ref 13–17)
IMM GRANULOCYTES NFR BLD AUTO: 0.4 % — SIGNIFICANT CHANGE UP (ref 0–1.5)
LYMPHOCYTES # BLD AUTO: 19.8 % — SIGNIFICANT CHANGE UP (ref 13–44)
LYMPHOCYTES # BLD AUTO: 2.65 K/UL — SIGNIFICANT CHANGE UP (ref 1–3.3)
MCHC RBC-ENTMCNC: 26 PG — LOW (ref 27–34)
MCHC RBC-ENTMCNC: 31.8 GM/DL — LOW (ref 32–36)
MCV RBC AUTO: 81.8 FL — SIGNIFICANT CHANGE UP (ref 80–100)
MONOCYTES # BLD AUTO: 1.28 K/UL — HIGH (ref 0–0.9)
MONOCYTES NFR BLD AUTO: 9.6 % — SIGNIFICANT CHANGE UP (ref 2–14)
NEUTROPHILS # BLD AUTO: 9.27 K/UL — HIGH (ref 1.8–7.4)
NEUTROPHILS NFR BLD AUTO: 69.5 % — SIGNIFICANT CHANGE UP (ref 43–77)
NRBC # BLD: 0 /100 WBCS — SIGNIFICANT CHANGE UP (ref 0–0)
PLATELET # BLD AUTO: 220 K/UL — SIGNIFICANT CHANGE UP (ref 150–400)
POTASSIUM SERPL-MCNC: 3.5 MMOL/L — SIGNIFICANT CHANGE UP (ref 3.5–5.3)
POTASSIUM SERPL-SCNC: 3.5 MMOL/L — SIGNIFICANT CHANGE UP (ref 3.5–5.3)
PROT SERPL-MCNC: 7.1 G/DL — SIGNIFICANT CHANGE UP (ref 6–8.3)
RBC # BLD: 6.49 M/UL — HIGH (ref 4.2–5.8)
RBC # FLD: 18.4 % — HIGH (ref 10.3–14.5)
SODIUM SERPL-SCNC: 141 MMOL/L — SIGNIFICANT CHANGE UP (ref 135–145)
WBC # BLD: 13.36 K/UL — HIGH (ref 3.8–10.5)
WBC # FLD AUTO: 13.36 K/UL — HIGH (ref 3.8–10.5)

## 2019-04-10 PROCEDURE — 99232 SBSQ HOSP IP/OBS MODERATE 35: CPT

## 2019-04-10 RX ORDER — EZETIMIBE 10 MG/1
0 TABLET ORAL
Qty: 0 | Refills: 0 | COMMUNITY

## 2019-04-10 RX ORDER — AMLODIPINE BESYLATE 2.5 MG/1
0 TABLET ORAL
Qty: 0 | Refills: 0 | COMMUNITY

## 2019-04-10 RX ADMIN — SIMVASTATIN 40 MILLIGRAM(S): 20 TABLET, FILM COATED ORAL at 21:31

## 2019-04-10 RX ADMIN — SODIUM CHLORIDE 75 MILLILITER(S): 9 INJECTION INTRAMUSCULAR; INTRAVENOUS; SUBCUTANEOUS at 08:46

## 2019-04-10 RX ADMIN — PIPERACILLIN AND TAZOBACTAM 25 GRAM(S): 4; .5 INJECTION, POWDER, LYOPHILIZED, FOR SOLUTION INTRAVENOUS at 08:46

## 2019-04-10 RX ADMIN — PIPERACILLIN AND TAZOBACTAM 25 GRAM(S): 4; .5 INJECTION, POWDER, LYOPHILIZED, FOR SOLUTION INTRAVENOUS at 21:31

## 2019-04-10 RX ADMIN — PIPERACILLIN AND TAZOBACTAM 25 GRAM(S): 4; .5 INJECTION, POWDER, LYOPHILIZED, FOR SOLUTION INTRAVENOUS at 01:17

## 2019-04-10 RX ADMIN — PIPERACILLIN AND TAZOBACTAM 25 GRAM(S): 4; .5 INJECTION, POWDER, LYOPHILIZED, FOR SOLUTION INTRAVENOUS at 14:34

## 2019-04-10 NOTE — PROGRESS NOTE ADULT - SUBJECTIVE AND OBJECTIVE BOX
INTERVAL HPI/OVERNIGHT EVENTS:  Feels 60% better than yesterday.  LLQ tenderness improved.  Hungry.    SUBJECTIVE:  Flatus: [ X  ] YES [   ] NO             Bowel Movement: [   ] YES [ X  ] NO  Pain (0-10):            Pain Control Adequate: [  X ] YES [   ] NO  Nausea: [   ] YES [  X ] NO            Vomiting: [   ] YES [X   ] NO  Diarrhea: [   ] YES [ X  ] NO         Constipation: [   ] YES [  X ] NO     Chest Pain: [   ] YES [ X  ] NO    SOB:  [   YES [X   ] NO  No Known Allergies        MEDICATIONS  (STANDING):  amLODIPine   Tablet 5 milliGRAM(s) Oral daily  hydrochlorothiazide 25 milliGRAM(s) Oral daily  losartan 100 milliGRAM(s) Oral daily  piperacillin/tazobactam IVPB. 3.375 Gram(s) IV Intermittent every 8 hours  simvastatin 40 milliGRAM(s) Oral at bedtime  sodium chloride 0.9%. 1000 milliLiter(s) (75 mL/Hr) IV Continuous <Continuous>    MEDICATIONS  (PRN):  acetaminophen   Tablet .. 650 milliGRAM(s) Oral every 6 hours PRN Temp greater or equal to 38C (100.4F), Mild Pain (1 - 3)  morphine  - Injectable 2 milliGRAM(s) IV Push every 6 hours PRN Moderate Pain (4 - 6)  morphine  - Injectable 4 milliGRAM(s) IV Push every 6 hours PRN Severe Pain (7 - 10)  ondansetron Injectable 4 milliGRAM(s) IV Push every 6 hours PRN Nausea and/or Vomiting      Vital Signs Last 24 Hrs  T(C): 37.2 (10 Apr 2019 06:04), Max: 38.6 (2019 13:56)  T(F): 99 (10 Apr 2019 06:04), Max: 101.4 (2019 13:56)  HR: 86 (10 Apr 2019 06:04) (86 - 124)  BP: 104/68 (10 Apr 2019 06:04) (104/68 - 139/86)  BP(mean): --  RR: 16 (10 Apr 2019 06:04) (16 - 18)  SpO2: 95% (10 Apr 2019 06:04) (93% - 96%)        PHYSICAL EXAM  General: No acute distress, appears comfortable, conversant, well nourished  Head, Eyes, Ears, Nose, Throat: Normal cephalic/atraumatic, MUNA, EOMI, , anicteric, conjunctiva non injected and moist, vision grossly intact, hearing grossly intact, no nasal discharge, ears and nose symmetrical and atraumatic.  Nasal, oral, and oropharyngeal mucosa pink moist with no evidence of ulceration  Neck: Supple, carotids have good upstroke, trachea in the midline, without JVD or thyromegaly  Lymphatic: No evidence of masses or lymphadenopathy in the head, neck, trunk, axillary, inguinal, or supraclavicular regions  Chest: Lungs are clear to P&A, no wheezing, no rales, no ronchi, with good inspiratory effort  Heart: Heart rhythm regular, no murmurs  Extremity: No swelling, or open sores, no gross deformities,  good range of motion, no edema,  Elena's sign  negative, no lymphadenopathy  Neuro: Alert and oriented x3, motor and sensory intact  Psychiatric: Awake , alert, oriented x3 with an appropriate affect.   Skin: Good color, turgor, texture with no gross lesions, no eruptions, no rashes, no subcutaneous nodules and normal temperature.     Abdomen: Soft, LLQ tendeness improved, good bowel sounds present in all four quadrants.  No guarding, rebound, and no peritoneal signs.  No evidence of hepatosplenomegaly.  No evidence of abdominal wall hernias.  Inguinal regions are unremarkable with no evidence of hernias.       I&O's Detail      LABS:  ALL LABORATORY STUDIES WERE REVIEWED IN THEIR ENTIRETY                        16.9   13.36 )-----------( 220      ( 10 Apr 2019 06:40 )             53.1     04-10    141  |  104  |  16  ----------------------------<  89  3.5   |  29  |  1.30    Ca    8.4<L>      10 Apr 2019 06:40    TPro  7.1  /  Alb  3.0<L>  /  TBili  1.9<H>  /  DBili  x   /  AST  17  /  ALT  11<L>  /  AlkPhos  67  04-10    PT/INR - ( 2019 14:50 )   PT: 14.1 sec;   INR: 1.24 ratio         PTT - ( 2019 14:50 )  PTT:34.6 sec  Urinalysis Basic - ( 2019 17:03 )    Color: Yellow / Appearance: Clear / S.015 / pH: x  Gluc: x / Ketone: Negative  / Bili: Negative / Urobili: Negative   Blood: x / Protein: 25 mg/dL / Nitrite: Negative   Leuk Esterase: Negative / RBC: 0-2 /HPF / WBC 0-2   Sq Epi: x / Non Sq Epi: Occasional / Bacteria: Occasional                  RADIOLOGY & ADDITIONAL STUDIES:  RADIOLOGIC STUDIES WERE REVIEWED AND DISCUSSED WITH THE RADIOLOGIST    < from: CT Abdomen and Pelvis w/ Oral Cont and w/ IV Cont (19 @ 17:22) >    EXAM:  CT ABDOMEN AND PELVIS OC IC                            *** ADDENDUM 2019  ***    Please note the body the report contains a voice transcription error. The   corrected line should read as follows: Segmental wall thickening with   associated inflammatory response proximal sigmoid      *** END OF ADDENDUM 2019  ***      PROCEDURE DATE:  2019          INTERPRETATION:  History: Left lower quadrant pain    CT abdomen and pelvis oral and IV contrast.  95 cc Omnipaque 350 injected intravenously.  Coronary artery calcification. Moderate pericardial effusion.  Calcified gallstone. No biliary dilatation. Liver pancreas spleen   adrenals not remarkable. Prominent left extrarenal pelvis.  Atherosclerotic nonaneurysmal abdominal aorta.  No suspicious retroperitoneal adenopathy.  Colonic diverticula.There is segmental wall thickening with associated   inflammatory response small sigmoid. Tiny extraluminal gas bubbles in the   vicinity of the sigmoid . Findings consistent with microperforated   sigmoid diverticulitis. No dispersed free air throughout the greater   peritoneal cavity. No drainable collection. No bowel obstruction.  Slightly prominent size prostate with calcification. Bladder not   remarkable.  No acute or aggressive osseous pathology    Impression:    Microperforated sigmoid diverticulitis. No drainable collection.  Additional findings as discussed.    Discussed with Dr. Garcia prior to this dictation      ***Please see the addendum at the top of this report. It may contain   additional important information or changes.****          MOLINA PLASCENCIA M.D., ATTENDING RADIOLOGIST  This document has been electronically signed. 2019  5:31PM  Addend:  MOLINA PLASCENCIA M.D., ATTENDING RADIOLOGIST  This addendum was electronically signed on: 2019  5:40PM.          < end of copied text >

## 2019-04-10 NOTE — DISCHARGE NOTE PROVIDER - NSDCCPCAREPLAN_GEN_ALL_CORE_FT
PRINCIPAL DISCHARGE DIAGNOSIS  Diagnosis: Diverticulitis of intestine with perforation without bleeding, unspecified part of intestinal tract  Assessment and Plan of Treatment: complete course of antibiotic.  Follow up with Dr. Moore on 4/15.      SECONDARY DISCHARGE DIAGNOSES  Diagnosis: Hyperlipidemia  Assessment and Plan of Treatment: continue your cholesterol lowering medication    Diagnosis: Hypertension  Assessment and Plan of Treatment: continue antihypertensive medication    Diagnosis: Polycythemia  Assessment and Plan of Treatment: Follow up with your hematologist

## 2019-04-10 NOTE — DISCHARGE NOTE PROVIDER - CARE PROVIDER_API CALL
Bill Darling (MD)  Internal Medicine  [address], [address]rd [place] [address]  Phone: [phone]  Fax: [phone]  Follow Up Time:     Po Moore (DO)  Surgery  [address] , [place] [address]  Phone: [phone]  Fax: [phone]  Follow Up Time:

## 2019-04-10 NOTE — PROGRESS NOTE ADULT - SUBJECTIVE AND OBJECTIVE BOX
CHIEF COMPLAINT/INTERVAL HISTORY:  Pt. seen and evaluated for sigmoid diverticulitis with microperforation.  Pt. is in no distress.  Feeling better today.  Reports much less abdominal pain in LLQ.  Tolerating clear liquid diet.  On IV antibiotic.     REVIEW OF SYSTEMS:  Afebrile, no CP, or SOB.      Vital Signs Last 24 Hrs  T(C): 36.9 (10 Apr 2019 12:26), Max: 38.6 (2019 13:56)  T(F): 98.5 (10 Apr 2019 12:26), Max: 101.4 (2019 13:56)  HR: 80 (10 Apr 2019 12:26) (80 - 124)  BP: 128/78 (10 Apr 2019 12:26) (104/68 - 139/86)  BP(mean): --  RR: 18 (10 Apr 2019 12:26) (16 - 18)  SpO2: 95% (10 Apr 2019 12:26) (93% - 96%)    PHYSICAL EXAM:  GENERAL: NAD  HEENT: EOMI, hearing normal, conjunctiva and sclera clear  Chest: CTA bilaterally, no wheezing  CV: S1S2, RRR,   GI: soft, +BS, ND, mild tenderness of LLQ.  No rebound or guarding.  Musculoskeletal: no edema  Psychiatric: affect nL, mood nL  Skin: warm and dry    LABS:                        16.9   13.36 )-----------( 220      ( 10 Apr 2019 06:40 )             53.1     04-10    141  |  104  |  16  ----------------------------<  89  3.5   |  29  |  1.30    Ca    8.4<L>      10 Apr 2019 06:40    TPro  7.1  /  Alb  3.0<L>  /  TBili  1.9<H>  /  DBili  x   /  AST  17  /  ALT  11<L>  /  AlkPhos  67  04-10    PT/INR - ( 2019 14:50 )   PT: 14.1 sec;   INR: 1.24 ratio         PTT - ( 2019 14:50 )  PTT:34.6 sec  Urinalysis Basic - ( 2019 17:03 )    Color: Yellow / Appearance: Clear / S.015 / pH: x  Gluc: x / Ketone: Negative  / Bili: Negative / Urobili: Negative   Blood: x / Protein: 25 mg/dL / Nitrite: Negative   Leuk Esterase: Negative / RBC: 0-2 /HPF / WBC 0-2   Sq Epi: x / Non Sq Epi: Occasional / Bacteria: Occasional        Assessment and Plan:  -sigmoid diverticulitis with microperforation:  Continue clear liquid diet and NS@75cc/hr.  Continue Zosyn and analgesics PRN.  Check blood cx.  Surgery f/u  -Polycythemia:  Monitor hemoglobin  -HTN:  continue Norvasc 5mg PO daily, HCTZ 25mg PO daily, and losartan 100mg PO daily  -HLD:  continue statin therapy  -VTE ppx: SCD

## 2019-04-10 NOTE — DISCHARGE NOTE PROVIDER - HOSPITAL COURSE
60 yo M with PMH of HTN, HLD, polycythemia (as per chart review) history of Ventral hernia with obstruction s/p surgery 2/2019, who presented to the ED with LLQ pain that has worsened over the past 3 days.  As per patient, 3 days ago patient began to feel more fatigued with subjective temperature, and soft stools. Today patient noted a temperature of 100.9 at home, and went to Urgent Care. At urgent care patient had a temperature of 102, and was very tender to LLQ palpation and was sent to the ED. As per patient, LLQ "is not bad" but was very tender only when physician palpated area. Admits to chills, denies N/V/C, changes in appetite. Of note as per patient had colonoscopy 6 months ago that showed diverticulosis.          In the ED, patient was febrile (Tmax 101.4), tachycardic (), otherwise hemodynamically stable.    Labs significant for leukocytosis (WBC 14.78) with Left shift (Neut 80%), polycythemia (H/H 18.1/55.3, previously 18.9/58.8), elevated BUN (26), elevated Bilirubin (1.8).    UA significant for protein 25, small blood, occasional bacteria.    EKG: NSR     Flu A/B/RSV negative.    CXR: Minimal infiltrate is seen at left base.      AXR: Nonobstructive bowel gas pattern, no free air.    CT Abdomen/Pelvis with oral/IV Contrast: Microperforated sigmoid diverticulitis, no drainable collection.    Received Zosyn 3.375g, NS Bolus 1L x1.        Pt. was admitted and continued on IV antibiotic.  He had surgical consultation.  Clinically patient improved with decreasing abdominal pain.  His diet was advanced. 58 yo M with PMH of HTN, HLD, polycythemia (as per chart review) history of Ventral hernia with obstruction s/p surgery 2/2019, who presented to the ED with LLQ pain that has worsened over the past 3 days.  As per patient, 3 days ago patient began to feel more fatigued with subjective temperature, and soft stools. Today patient noted a temperature of 100.9 at home, and went to Urgent Care. At urgent care patient had a temperature of 102, and was very tender to LLQ palpation and was sent to the ED. As per patient, LLQ "is not bad" but was very tender only when physician palpated area. Admits to chills, denies N/V/C, changes in appetite. Of note as per patient had colonoscopy 6 months ago that showed diverticulosis.          In the ED, patient was febrile (Tmax 101.4), tachycardic (), otherwise hemodynamically stable.    Labs significant for leukocytosis (WBC 14.78) with Left shift (Neut 80%), polycythemia (H/H 18.1/55.3, previously 18.9/58.8), elevated BUN (26), elevated Bilirubin (1.8).    UA significant for protein 25, small blood, occasional bacteria.    EKG: NSR     Flu A/B/RSV negative.    CXR: Minimal infiltrate is seen at left base.      AXR: Nonobstructive bowel gas pattern, no free air.    CT Abdomen/Pelvis with oral/IV Contrast: Microperforated sigmoid diverticulitis, no drainable collection.    Received Zosyn 3.375g, NS Bolus 1L x1.        Pt. was admitted and continued on IV antibiotic.  He had surgical consultation.  Clinically patient improved with resolved abdominal pain.  His diet was advanced.  Blood cx were negative.          On day of discharge patient is in no distress.  With no further abdominal pain.  Afebrile and hemodynamically stable.          Completion of discharge in 28 minutes.

## 2019-04-11 ENCOUNTER — TRANSCRIPTION ENCOUNTER (OUTPATIENT)
Age: 59
End: 2019-04-11

## 2019-04-11 VITALS
SYSTOLIC BLOOD PRESSURE: 142 MMHG | RESPIRATION RATE: 17 BRPM | OXYGEN SATURATION: 97 % | DIASTOLIC BLOOD PRESSURE: 72 MMHG | HEART RATE: 69 BPM | TEMPERATURE: 98 F

## 2019-04-11 LAB
ALBUMIN SERPL ELPH-MCNC: 2.9 G/DL — LOW (ref 3.3–5)
ALP SERPL-CCNC: 71 U/L — SIGNIFICANT CHANGE UP (ref 40–120)
ALT FLD-CCNC: 12 U/L — SIGNIFICANT CHANGE UP (ref 12–78)
ANION GAP SERPL CALC-SCNC: 8 MMOL/L — SIGNIFICANT CHANGE UP (ref 5–17)
AST SERPL-CCNC: 21 U/L — SIGNIFICANT CHANGE UP (ref 15–37)
BILIRUB SERPL-MCNC: 1.8 MG/DL — HIGH (ref 0.2–1.2)
BUN SERPL-MCNC: 12 MG/DL — SIGNIFICANT CHANGE UP (ref 7–23)
CALCIUM SERPL-MCNC: 8.6 MG/DL — SIGNIFICANT CHANGE UP (ref 8.5–10.1)
CHLORIDE SERPL-SCNC: 108 MMOL/L — SIGNIFICANT CHANGE UP (ref 96–108)
CO2 SERPL-SCNC: 25 MMOL/L — SIGNIFICANT CHANGE UP (ref 22–31)
CREAT SERPL-MCNC: 0.99 MG/DL — SIGNIFICANT CHANGE UP (ref 0.5–1.3)
CULTURE RESULTS: SIGNIFICANT CHANGE UP
GLUCOSE SERPL-MCNC: 95 MG/DL — SIGNIFICANT CHANGE UP (ref 70–99)
HCT VFR BLD CALC: 52.3 % — HIGH (ref 39–50)
HGB BLD-MCNC: 16.9 G/DL — SIGNIFICANT CHANGE UP (ref 13–17)
MAGNESIUM SERPL-MCNC: 2.4 MG/DL — SIGNIFICANT CHANGE UP (ref 1.6–2.6)
MCHC RBC-ENTMCNC: 26.4 PG — LOW (ref 27–34)
MCHC RBC-ENTMCNC: 32.3 GM/DL — SIGNIFICANT CHANGE UP (ref 32–36)
MCV RBC AUTO: 81.7 FL — SIGNIFICANT CHANGE UP (ref 80–100)
NRBC # BLD: 0 /100 WBCS — SIGNIFICANT CHANGE UP (ref 0–0)
PLATELET # BLD AUTO: 199 K/UL — SIGNIFICANT CHANGE UP (ref 150–400)
POTASSIUM SERPL-MCNC: 3.6 MMOL/L — SIGNIFICANT CHANGE UP (ref 3.5–5.3)
POTASSIUM SERPL-SCNC: 3.6 MMOL/L — SIGNIFICANT CHANGE UP (ref 3.5–5.3)
PROT SERPL-MCNC: 7 G/DL — SIGNIFICANT CHANGE UP (ref 6–8.3)
RBC # BLD: 6.4 M/UL — HIGH (ref 4.2–5.8)
RBC # FLD: 18.7 % — HIGH (ref 10.3–14.5)
SODIUM SERPL-SCNC: 141 MMOL/L — SIGNIFICANT CHANGE UP (ref 135–145)
SPECIMEN SOURCE: SIGNIFICANT CHANGE UP
WBC # BLD: 9.31 K/UL — SIGNIFICANT CHANGE UP (ref 3.8–10.5)
WBC # FLD AUTO: 9.31 K/UL — SIGNIFICANT CHANGE UP (ref 3.8–10.5)

## 2019-04-11 PROCEDURE — 83735 ASSAY OF MAGNESIUM: CPT

## 2019-04-11 PROCEDURE — 86803 HEPATITIS C AB TEST: CPT

## 2019-04-11 PROCEDURE — 71045 X-RAY EXAM CHEST 1 VIEW: CPT

## 2019-04-11 PROCEDURE — 87631 RESP VIRUS 3-5 TARGETS: CPT

## 2019-04-11 PROCEDURE — 74177 CT ABD & PELVIS W/CONTRAST: CPT

## 2019-04-11 PROCEDURE — 83690 ASSAY OF LIPASE: CPT

## 2019-04-11 PROCEDURE — 83605 ASSAY OF LACTIC ACID: CPT

## 2019-04-11 PROCEDURE — 74019 RADEX ABDOMEN 2 VIEWS: CPT

## 2019-04-11 PROCEDURE — 99285 EMERGENCY DEPT VISIT HI MDM: CPT | Mod: 25

## 2019-04-11 PROCEDURE — 85730 THROMBOPLASTIN TIME PARTIAL: CPT

## 2019-04-11 PROCEDURE — 93005 ELECTROCARDIOGRAM TRACING: CPT

## 2019-04-11 PROCEDURE — 96374 THER/PROPH/DIAG INJ IV PUSH: CPT

## 2019-04-11 PROCEDURE — 87086 URINE CULTURE/COLONY COUNT: CPT

## 2019-04-11 PROCEDURE — 81001 URINALYSIS AUTO W/SCOPE: CPT

## 2019-04-11 PROCEDURE — 87040 BLOOD CULTURE FOR BACTERIA: CPT

## 2019-04-11 PROCEDURE — 85610 PROTHROMBIN TIME: CPT

## 2019-04-11 PROCEDURE — 99238 HOSP IP/OBS DSCHRG MGMT 30/<: CPT

## 2019-04-11 PROCEDURE — 80053 COMPREHEN METABOLIC PANEL: CPT

## 2019-04-11 PROCEDURE — 36415 COLL VENOUS BLD VENIPUNCTURE: CPT

## 2019-04-11 PROCEDURE — 85027 COMPLETE CBC AUTOMATED: CPT

## 2019-04-11 RX ADMIN — AMLODIPINE BESYLATE 5 MILLIGRAM(S): 2.5 TABLET ORAL at 06:09

## 2019-04-11 RX ADMIN — Medication 25 MILLIGRAM(S): at 06:09

## 2019-04-11 RX ADMIN — LOSARTAN POTASSIUM 100 MILLIGRAM(S): 100 TABLET, FILM COATED ORAL at 06:09

## 2019-04-11 RX ADMIN — PIPERACILLIN AND TAZOBACTAM 25 GRAM(S): 4; .5 INJECTION, POWDER, LYOPHILIZED, FOR SOLUTION INTRAVENOUS at 06:10

## 2019-04-11 NOTE — PROGRESS NOTE ADULT - SUBJECTIVE AND OBJECTIVE BOX
CHIEF COMPLAINT/INTERVAL HISTORY:  Pt. seen and evaluated for sigmoid diverticulitis with microperforation.  Pt. is feeling well.  Denies having any abdominal pain.  Tolerating IV antibiotic.  Advanced to full liquid diet with no issues.      REVIEW OF SYSTEMS:  No fever, CP, SOB, or abdominal pain.    Vital Signs Last 24 Hrs  T(C): 36.7 (2019 08:26), Max: 37 (10 Apr 2019 16:58)  T(F): 98 (2019 08:26), Max: 98.6 (10 Apr 2019 16:58)  HR: 71 (2019 08:26) (70 - 88)  BP: 127/79 (2019 08:26) (126/81 - 137/89)  BP(mean): --  RR: 17 (2019 08:26) (16 - 17)  SpO2: 95% (2019 08:) (95% - 96%)    PHYSICAL EXAM:  GENERAL: NAD  HEENT: EOMI, hearing normal, conjunctiva and sclera clear  Chest: CTA bilaterally, no wheezing  CV: S1S2, RRR,   GI: soft, +BS, NT/ND  Musculoskeletal: no edema  Psychiatric: affect nL, mood nL  Skin: warm and dry    LABS:                        16.9   9.31  )-----------( 199      ( 2019 08:04 )             52.3     04-11    141  |  108  |  12  ----------------------------<  95  3.6   |  25  |  0.99    Ca    8.6      2019 08:04  Mg     2.4     -11    TPro  7.0  /  Alb  2.9<L>  /  TBili  1.8<H>  /  DBili  x   /  AST  21  /  ALT  12  /  AlkPhos  71  04-11    PT/INR - ( 2019 14:50 )   PT: 14.1 sec;   INR: 1.24 ratio         PTT - ( 2019 14:50 )  PTT:34.6 sec  Urinalysis Basic - ( 2019 17:03 )    Color: Yellow / Appearance: Clear / S.015 / pH: x  Gluc: x / Ketone: Negative  / Bili: Negative / Urobili: Negative   Blood: x / Protein: 25 mg/dL / Nitrite: Negative   Leuk Esterase: Negative / RBC: 0-2 /HPF / WBC 0-2   Sq Epi: x / Non Sq Epi: Occasional / Bacteria: Occasional        Assessment and Plan:  -Sigmoid diverticulitis with microperforation:  Advance to low residue diet.  Continue Zosyn and analgesics PRN.  blood cx negative.  Surgery f/u  -Polycythemia:  Monitor hemoglobin  -HTN:  continue Norvasc 5mg PO daily, HCTZ 25mg PO daily, and losartan 100mg PO daily  -HLD:  continue statin therapy  -VTE ppx: SCD

## 2019-04-11 NOTE — PROGRESS NOTE ADULT - SUBJECTIVE AND OBJECTIVE BOX
INTERVAL HPI/OVERNIGHT EVENTS: Patient feels much better virtually no pain.  Tolerating liquids.     SUBJECTIVE:  Flatus: [ X  ] YES [   ] NO             Bowel Movement: [X   ] YES [   ] NO  Pain (0-10):    1          Nausea: [   ] YES [x   ] NO            Vomiting: [   ] YES [ x  ] NO  Diarrhea: [   ] YES [x   ] NO         Constipation: [   ] YES [ x  ] NO     Chest Pain: [   ] YES [  x ] NO    SOB:  [   YES [x   ] NO  No Known Allergies        MEDICATIONS  (STANDING):  amLODIPine   Tablet 5 milliGRAM(s) Oral daily  hydrochlorothiazide 25 milliGRAM(s) Oral daily  losartan 100 milliGRAM(s) Oral daily  piperacillin/tazobactam IVPB. 3.375 Gram(s) IV Intermittent every 8 hours  simvastatin 40 milliGRAM(s) Oral at bedtime  sodium chloride 0.9%. 1000 milliLiter(s) (75 mL/Hr) IV Continuous <Continuous>    MEDICATIONS  (PRN):  acetaminophen   Tablet .. 650 milliGRAM(s) Oral every 6 hours PRN Temp greater or equal to 38C (100.4F), Mild Pain (1 - 3)  morphine  - Injectable 2 milliGRAM(s) IV Push every 6 hours PRN Moderate Pain (4 - 6)  morphine  - Injectable 4 milliGRAM(s) IV Push every 6 hours PRN Severe Pain (7 - 10)  ondansetron Injectable 4 milliGRAM(s) IV Push every 6 hours PRN Nausea and/or Vomiting      Vital Signs Last 24 Hrs  T(C): 36.6 (2019 00:08), Max: 37 (10 Apr 2019 16:58)  T(F): 97.8 (2019 00:08), Max: 98.6 (10 Apr 2019 16:58)  HR: 70 (2019 06:07) (70 - 88)  BP: 127/80 (2019 06:07) (126/81 - 137/89)  BP(mean): --  RR: 16 (2019 06:07) (16 - 18)  SpO2: 96% (2019 06:07) (95% - 96%)        PHYSICAL EXAM  General: No acute distress, appears comfortable, conversant, well nourished  Head, Eyes, Ears, Nose, Throat: Normal cephalic/atraumatic, MUNA, EOMI, , anicteric, conjunctiva non injected and moist, vision grossly intact, hearing grossly intact, no nasal discharge, ears and nose symmetrical and atraumatic.  Nasal, oral, and oropharyngeal mucosa pink moist with no evidence of ulceration  Neck: Supple, carotids have good upstroke, trachea in the midline, without JVD or thyromegaly  Lymphatic: No evidence of masses or lymphadenopathy in the head, neck, trunk, axillary, inguinal, or supraclavicular regions  Chest: Lungs are clear to P&A, no wheezing, no rales, no ronchi, with good inspiratory effort  Heart: Heart rhythm regular, no murmurs  Extremity: No swelling, or open sores, no gross deformities,  good range of motion, no edema,  Elena's sign  negative, no lymphadenopathy  Neuro: Alert and oriented x3, motor and sensory intact  Psychiatric: Awake , alert, oriented x3 with an appropriate affect.   Skin: Good color, turgor, texture with no gross lesions, no eruptions, no rashes, no subcutaneous nodules and normal temperature.     Abdomen: Soft, non tender, good bowel sounds present in all four quadrants.  No guarding, rebound, and no peritoneal signs.  No evidence of hepatosplenomegaly.  No evidence of abdominal wall hernias.  Inguinal regions are unremarkable with no evidence of hernias.       I&O's Detail    10 Apr 2019 07:01  -  2019 07:00  --------------------------------------------------------  IN:    Oral Fluid: 600 mL  Total IN: 600 mL    OUT:    Voided: 250 mL  Total OUT: 250 mL    Total NET: 350 mL          LABS:  ALL LABORATORY STUDIES WERE REVIEWED IN THEIR ENTIRETY                        16.9   13.36 )-----------( 220      ( 10 Apr 2019 06:40 )             53.1     04-10    141  |  104  |  16  ----------------------------<  89  3.5   |  29  |  1.30    Ca    8.4<L>      10 Apr 2019 06:40    TPro  7.1  /  Alb  3.0<L>  /  TBili  1.9<H>  /  DBili  x   /  AST  17  /  ALT  11<L>  /  AlkPhos  67  04-10    PT/INR - ( 2019 14:50 )   PT: 14.1 sec;   INR: 1.24 ratio         PTT - ( 2019 14:50 )  PTT:34.6 sec  Urinalysis Basic - ( 2019 17:03 )    Color: Yellow / Appearance: Clear / S.015 / pH: x  Gluc: x / Ketone: Negative  / Bili: Negative / Urobili: Negative   Blood: x / Protein: 25 mg/dL / Nitrite: Negative   Leuk Esterase: Negative / RBC: 0-2 /HPF / WBC 0-2   Sq Epi: x / Non Sq Epi: Occasional / Bacteria: Occasional          Culture - Blood (collected 2019 20:31)  Source: .Blood Blood-Peripheral  Preliminary Report (10 Apr 2019 21:01):    No growth to date.    Culture - Blood (collected 2019 20:31)  Source: .Blood Blood-Peripheral  Preliminary Report (10 Apr 2019 21:01):    No growth to date.

## 2019-04-11 NOTE — DISCHARGE NOTE NURSING/CASE MANAGEMENT/SOCIAL WORK - NSDCDPATPORTLINK_GEN_ALL_CORE
You can access the [facility], offered by [facility], by registering with the following website: [email or web address]

## 2019-04-11 NOTE — PROGRESS NOTE ADULT - ASSESSMENT
Diverticulitis resolving.    Will advance to full liquids for lunch and and breakfast, low residue for dinner.  If tolerates can DC on PO abxs and low residue diet.    Can follow up in my office on Monday 04/15.

## 2019-04-14 ENCOUNTER — TRANSCRIPTION ENCOUNTER (OUTPATIENT)
Age: 59
End: 2019-04-14

## 2019-04-14 LAB
CULTURE RESULTS: SIGNIFICANT CHANGE UP
CULTURE RESULTS: SIGNIFICANT CHANGE UP
SPECIMEN SOURCE: SIGNIFICANT CHANGE UP
SPECIMEN SOURCE: SIGNIFICANT CHANGE UP

## 2019-07-22 ENCOUNTER — TRANSCRIPTION ENCOUNTER (OUTPATIENT)
Age: 59
End: 2019-07-22

## 2019-09-06 ENCOUNTER — TRANSCRIPTION ENCOUNTER (OUTPATIENT)
Age: 59
End: 2019-09-06

## 2021-05-12 NOTE — ED ADULT NURSE NOTE - ED STAT RN HANDOFF DETAILS
Back to baseline labs and CT normal. Plan - DC to group home with neuro follow up. report given to night RAY Main

## 2022-01-12 NOTE — H&P PST ADULT - PMH
The patient has questions about his new heart medicine.  He states his legs are so weak he can hardly walk.  Please advise.    Hyperlipidemia    Hypertension    Other microscopic hematuria Hyperlipidemia    Hypertension    Other microscopic hematuria    Polycythemia  followed by hematologist treated with phlebotomy

## 2022-07-02 ENCOUNTER — NON-APPOINTMENT (OUTPATIENT)
Age: 62
End: 2022-07-02

## 2023-07-14 NOTE — H&P PST ADULT - NS PRO TALK SOMEONE YN
[facility]    Background: Transitional Care Management program identified per system criteria and reviewed by [facility] team for possible outreach.    Assessment: Upon chart review, CCR Team member will not proceed with patient outreach related to this episode of Transitional Care Management program due to reason below:    Patient has active communication with a nurse, provider or care team for reason of post-hospital follow up plan.  Outreach call by CCR team not indicated to minimize duplicative efforts.     Plan: Transitional Care Management episode addressed appropriately per reason noted above.      Olga Bragg  Community Health Worker  [facility]  [phone]      *Connected Care Resource Team does NOT follow patient ongoing. Referrals are identified based on internal discharge reports and the outreach is to ensure patient has an understanding of their discharge instructions.  
no

## 2023-08-23 NOTE — ASU PREOP CHECKLIST - HEIGHT IN INCHES
9 Bexarotene Counseling:  I discussed with the patient the risks of bexarotene including but not limited to hair loss, dry lips/skin/eyes, liver abnormalities, hyperlipidemia, pancreatitis, depression/suicidal ideation, photosensitivity, drug rash/allergic reactions, hypothyroidism, anemia, leukopenia, infection, cataracts, and teratogenicity.  Patient understands that they will need regular blood tests to check lipid profile, liver function tests, white blood cell count, thyroid function tests and pregnancy test if applicable.

## 2023-09-13 ENCOUNTER — OFFICE (OUTPATIENT)
Dept: URBAN - METROPOLITAN AREA CLINIC 109 | Facility: CLINIC | Age: 63
Setting detail: OPHTHALMOLOGY
End: 2023-09-13
Payer: COMMERCIAL

## 2023-09-13 DIAGNOSIS — H25.13: ICD-10-CM

## 2023-09-13 PROCEDURE — 99213 OFFICE O/P EST LOW 20 MIN: CPT | Performed by: OPHTHALMOLOGY

## 2023-09-13 ASSESSMENT — KERATOMETRY
OD_AXISANGLE_DEGREES: 092
OS_K2POWER_DIOPTERS: 43.75
OD_K2POWER_DIOPTERS: 43.00
OD_K1POWER_DIOPTERS: 42.50
OS_AXISANGLE_DEGREES: 097
OS_K1POWER_DIOPTERS: 43.25

## 2023-09-13 ASSESSMENT — REFRACTION_AUTOREFRACTION
OS_AXIS: 028
OS_SPHERE: +1.50
OD_CYLINDER: -0.25
OS_CYLINDER: -0.25
OD_SPHERE: +1.75
OD_AXIS: 028

## 2023-09-13 ASSESSMENT — REFRACTION_MANIFEST
OS_SPHERE: +1.00
OS_SPHERE: +0.50
OS_VA1: 20/20
OD_AXIS: -
OD_CYLINDER: -
OD_SPHERE: +1.00
OS_ADD: +2.25
OD_ADD: +2.25
OD_SPHERE: +1.25
OD_VA1: 20/20
OD_VA1: 20/20-
OS_VA1: 20/20-

## 2023-09-13 ASSESSMENT — AXIALLENGTH_DERIVED
OS_AL: 23.0681
OD_AL: 23.2396

## 2023-09-13 ASSESSMENT — REFRACTION_CURRENTRX
OS_ADD: +2.25
OS_SPHERE: +1.25
OD_AXIS: 057
OD_SPHERE: +2.00
OS_CYLINDER: -0.25
OD_OVR_VA: 20/
OS_AXIS: 037
OD_CYLINDER: -0.25
OD_ADD: +2.25
OS_OVR_VA: 20/

## 2023-09-13 ASSESSMENT — SPHEQUIV_DERIVED
OD_SPHEQUIV: 1.625
OS_SPHEQUIV: 1.375

## 2023-09-13 ASSESSMENT — TONOMETRY
OD_IOP_MMHG: 12
OS_IOP_MMHG: 13

## 2023-09-13 ASSESSMENT — VISUAL ACUITY
OD_BCVA: 20/20
OS_BCVA: 20/20

## 2023-09-13 ASSESSMENT — CONFRONTATIONAL VISUAL FIELD TEST (CVF)
OS_FINDINGS: FULL
OD_FINDINGS: FULL

## 2024-01-09 NOTE — H&P PST ADULT - FUNCTIONAL SCREEN CURRENT LEVEL: TRANSFERRING, MLM
Patient Education        Acute Low Back Pain: Exercises  Introduction  Here are some examples of typical rehabilitation exercises for your condition. Start each exercise slowly. Ease off the exercise if you start to have pain.  Your doctor or physical therapist will tell you when you can start these exercises and which ones will work best for you.  When you are not being active, find a comfortable position for rest. Some people are comfortable on the floor or a medium-firm bed with a small pillow under their head and another under their knees. Some people prefer to lie on their side with a pillow between their knees. Don't stay in one position for too long.  Take short walks (10 to 20 minutes) every 2 to 3 hours. Avoid slopes, hills, and stairs until you feel better. Walk only distances you can manage without pain, especially leg pain.  How to do the exercises  Back stretches    Get down on your hands and knees on the floor.  Relax your head and allow it to droop. Round your back up toward the ceiling until you feel a nice stretch in your upper, middle, and lower back. Hold this stretch for as long as it feels comfortable, or about 15 to 30 seconds.  Return to the starting position with a flat back while you are on your hands and knees.  Let your back sway by pressing your stomach toward the floor. Lift your buttocks toward the ceiling.  Hold this position for 15 to 30 seconds.  Repeat 2 to 4 times.  Follow-up care is a key part of your treatment and safety. Be sure to make and go to all appointments, and call your doctor if you are having problems. It's also a good idea to know your test results and keep a list of the medicines you take.  Where can you learn more?  Go to https://www.[employer].net/patientEd and enter Z071 to learn more about \"Acute Low Back Pain: Exercises.\"  Current as of: July 18, 2023               Content Version: 13.9  © 2076-3977 [employer].   Care instructions adapted under  (0) independent

## 2024-09-16 ENCOUNTER — OFFICE (OUTPATIENT)
Dept: URBAN - METROPOLITAN AREA CLINIC 109 | Facility: CLINIC | Age: 64
Setting detail: OPHTHALMOLOGY
End: 2024-09-16
Payer: COMMERCIAL

## 2024-09-16 DIAGNOSIS — H25.13: ICD-10-CM

## 2024-09-16 PROCEDURE — 92014 COMPRE OPH EXAM EST PT 1/>: CPT | Performed by: OPHTHALMOLOGY

## 2024-09-16 ASSESSMENT — CONFRONTATIONAL VISUAL FIELD TEST (CVF)
OS_FINDINGS: FULL
OD_FINDINGS: FULL

## 2025-01-29 ENCOUNTER — NON-APPOINTMENT (OUTPATIENT)
Age: 65
End: 2025-01-29

## 2025-02-20 NOTE — BRIEF OPERATIVE NOTE - OPERATION/FINDINGS
[facility]  [address]  [facility] [place] [address]  Dept Phone: [phone]  Dept Fax: [phone]      Orthopaedic Trauma Clinic Follow Up      Subjective:   Date of Surgery: 6/3/2023  -Open reduction internal fixation left acetabulum    Will Martin is a 29 y.o. year old male who presents to the clinic today for follow up chronic left leg pain.  Patient has approximately 1 year and 8 months out from the above listed procedure.  Of note he is only 1 month out from a recent first TMT arthrodesis on his right side which reports he has had a significant limp secondary to this putting much more pressure on his left leg wondering if this is contributing to his increased pain.    Patient reports pain in this left leg is most severe in the left groin but notes radiation of pain to the anterior thigh proximally.  He notes numbness and tingling that is present almost daily with any prolonged period of standing.  Patient denies any interval injury or trauma since his last appointment with us on 6/3/2024.    Of note patient has started to notice some increasing anterior knee pain over the last several months without known injury or trauma.  He does have a history of 2021 patellar fracture which he believed to have partial tearing of his PCL and MCL that was treated nonoperatively.    Of note patient did see orthopedic spine specialist at [facility] who ordered an MRI of the lumbar spine demonstrating L5-S1 central bulge with narrowing around the intrathecal S1 nerve roots.      Review of Systems  Gen: no fever, chills, malaise  CV: no chest pain or palpitations  Resp: no cough or shortness of breath  GI: no nausea, vomiting, diarrhea, or constipation  Neuro: no seizures, vertigo, or headache  Msk: See HPI  10 remaining systems reviewed and negative    Objective :   There were no vitals filed for this visit.Body mass index is 34.58 kg/m².  General: No acute distress, resting comfortably in the 
Incarcerated Ventral Hernia

## 2025-04-10 NOTE — H&P PST ADULT - NS PRO REGISTERED ORGAN DONOR
Your pain appears to be in the sacroiliac region on either side, also in the lower lumbar region adjacent to the sacrum or tailbone.    Take oxycodone when pain is severe, perhaps ibuprofen/Tylenol if more mild to moderate.   Okay to take muscle relaxant (methocarbamol) with pain meds.     Someone will contact you to help you to schedule an urgent spine clinic consult with Dr Leger or colleague.      Not due for annual exam until 10/2025.    No

## 2025-04-21 NOTE — ASU DISCHARGE PLAN (ADULT/PEDIATRIC). - RETURN TO WORK
for 24 hours/No
Patient needs outpatient endoscopy. Questionable gastritis or stomach ulcer. close follow up preferred.